# Patient Record
Sex: MALE | Race: BLACK OR AFRICAN AMERICAN | NOT HISPANIC OR LATINO
[De-identification: names, ages, dates, MRNs, and addresses within clinical notes are randomized per-mention and may not be internally consistent; named-entity substitution may affect disease eponyms.]

---

## 2017-02-21 ENCOUNTER — RECORDS - HEALTHEAST (OUTPATIENT)
Dept: ADMINISTRATIVE | Facility: OTHER | Age: 57
End: 2017-02-21

## 2017-02-22 ENCOUNTER — RECORDS - HEALTHEAST (OUTPATIENT)
Dept: ADMINISTRATIVE | Facility: OTHER | Age: 57
End: 2017-02-22

## 2017-12-19 ENCOUNTER — COMMUNICATION - HEALTHEAST (OUTPATIENT)
Dept: INTERNAL MEDICINE | Facility: CLINIC | Age: 57
End: 2017-12-19

## 2018-01-04 ENCOUNTER — COMMUNICATION - HEALTHEAST (OUTPATIENT)
Dept: INTERNAL MEDICINE | Facility: CLINIC | Age: 58
End: 2018-01-04

## 2018-01-05 ENCOUNTER — AMBULATORY - HEALTHEAST (OUTPATIENT)
Dept: INTERNAL MEDICINE | Facility: CLINIC | Age: 58
End: 2018-01-05

## 2018-01-08 ENCOUNTER — OFFICE VISIT - HEALTHEAST (OUTPATIENT)
Dept: INTERNAL MEDICINE | Facility: CLINIC | Age: 58
End: 2018-01-08

## 2018-01-08 DIAGNOSIS — Z00.00 ROUTINE GENERAL MEDICAL EXAMINATION AT A HEALTH CARE FACILITY: ICD-10-CM

## 2018-01-08 DIAGNOSIS — R19.7 DIARRHEA: ICD-10-CM

## 2018-01-08 DIAGNOSIS — G89.29 CHRONIC PAIN OF RIGHT KNEE: ICD-10-CM

## 2018-01-08 DIAGNOSIS — M25.561 CHRONIC PAIN OF RIGHT KNEE: ICD-10-CM

## 2018-01-08 DIAGNOSIS — K62.5 RECTAL BLEED: ICD-10-CM

## 2018-01-08 LAB
ALBUMIN SERPL-MCNC: 3.7 G/DL (ref 3.5–5)
ALBUMIN UR-MCNC: NEGATIVE MG/DL
ALP SERPL-CCNC: 60 U/L (ref 45–120)
ALT SERPL W P-5'-P-CCNC: 13 U/L (ref 0–45)
ANION GAP SERPL CALCULATED.3IONS-SCNC: 10 MMOL/L (ref 5–18)
APPEARANCE UR: CLEAR
AST SERPL W P-5'-P-CCNC: 18 U/L (ref 0–40)
BILIRUB SERPL-MCNC: 0.6 MG/DL (ref 0–1)
BILIRUB UR QL STRIP: NEGATIVE
BUN SERPL-MCNC: 16 MG/DL (ref 8–22)
CALCIUM SERPL-MCNC: 9.5 MG/DL (ref 8.5–10.5)
CHLORIDE BLD-SCNC: 105 MMOL/L (ref 98–107)
CHOLEST SERPL-MCNC: 142 MG/DL
CO2 SERPL-SCNC: 25 MMOL/L (ref 22–31)
COLOR UR AUTO: YELLOW
CREAT SERPL-MCNC: 1.28 MG/DL (ref 0.7–1.3)
ERYTHROCYTE [DISTWIDTH] IN BLOOD BY AUTOMATED COUNT: 13.6 % (ref 11–14.5)
FASTING STATUS PATIENT QL REPORTED: YES
GFR SERPL CREATININE-BSD FRML MDRD: 58 ML/MIN/1.73M2
GLUCOSE BLD-MCNC: 93 MG/DL (ref 70–125)
GLUCOSE UR STRIP-MCNC: NEGATIVE MG/DL
HCT VFR BLD AUTO: 43.1 % (ref 40–54)
HDLC SERPL-MCNC: 64 MG/DL
HGB BLD-MCNC: 14.1 G/DL (ref 14–18)
HGB UR QL STRIP: NEGATIVE
KETONES UR STRIP-MCNC: NEGATIVE MG/DL
LDLC SERPL CALC-MCNC: 68 MG/DL
LEUKOCYTE ESTERASE UR QL STRIP: NEGATIVE
MCH RBC QN AUTO: 29.2 PG (ref 27–34)
MCHC RBC AUTO-ENTMCNC: 32.7 G/DL (ref 32–36)
MCV RBC AUTO: 89 FL (ref 80–100)
NITRATE UR QL: NEGATIVE
PH UR STRIP: 8.5 [PH] (ref 5–8)
PLATELET # BLD AUTO: 310 THOU/UL (ref 140–440)
PMV BLD AUTO: 7.8 FL (ref 7–10)
POTASSIUM BLD-SCNC: 4.2 MMOL/L (ref 3.5–5)
PROT SERPL-MCNC: 7.9 G/DL (ref 6–8)
PSA SERPL-MCNC: 1.8 NG/ML (ref 0–3.5)
RBC # BLD AUTO: 4.83 MILL/UL (ref 4.4–6.2)
SODIUM SERPL-SCNC: 140 MMOL/L (ref 136–145)
SP GR UR STRIP: 1.02 (ref 1–1.03)
TRIGL SERPL-MCNC: 52 MG/DL
UROBILINOGEN UR STRIP-ACNC: ABNORMAL
WBC: 7.1 THOU/UL (ref 4–11)

## 2018-01-08 ASSESSMENT — MIFFLIN-ST. JEOR: SCORE: 1659.27

## 2018-01-09 LAB
25(OH)D3 SERPL-MCNC: 23.8 NG/ML (ref 30–80)
25(OH)D3 SERPL-MCNC: 23.8 NG/ML (ref 30–80)

## 2018-01-10 ENCOUNTER — COMMUNICATION - HEALTHEAST (OUTPATIENT)
Dept: INTERNAL MEDICINE | Facility: CLINIC | Age: 58
End: 2018-01-10

## 2018-01-23 ENCOUNTER — COMMUNICATION - HEALTHEAST (OUTPATIENT)
Dept: INTERNAL MEDICINE | Facility: CLINIC | Age: 58
End: 2018-01-23

## 2018-02-02 ENCOUNTER — RECORDS - HEALTHEAST (OUTPATIENT)
Dept: ADMINISTRATIVE | Facility: OTHER | Age: 58
End: 2018-02-02

## 2018-02-05 ENCOUNTER — RECORDS - HEALTHEAST (OUTPATIENT)
Dept: ADMINISTRATIVE | Facility: OTHER | Age: 58
End: 2018-02-05

## 2019-02-07 ENCOUNTER — OFFICE VISIT - HEALTHEAST (OUTPATIENT)
Dept: INTERNAL MEDICINE | Facility: CLINIC | Age: 59
End: 2019-02-07

## 2019-02-07 DIAGNOSIS — K51.211 ULCERATIVE PROCTITIS WITH RECTAL BLEEDING (H): ICD-10-CM

## 2019-02-07 DIAGNOSIS — M25.562 CHRONIC PAIN OF LEFT KNEE: ICD-10-CM

## 2019-02-07 DIAGNOSIS — Z23 NEED FOR PROPHYLACTIC VACCINATION WITH TETANUS-DIPHTHERIA (TD): ICD-10-CM

## 2019-02-07 DIAGNOSIS — G89.29 CHRONIC PAIN OF LEFT KNEE: ICD-10-CM

## 2019-02-07 DIAGNOSIS — Z23 IMMUNIZATION DUE: ICD-10-CM

## 2019-02-07 DIAGNOSIS — M54.50 LOW BACK PAIN WITHOUT SCIATICA, UNSPECIFIED BACK PAIN LATERALITY, UNSPECIFIED CHRONICITY: ICD-10-CM

## 2019-02-07 DIAGNOSIS — Z00.00 ROUTINE GENERAL MEDICAL EXAMINATION AT A HEALTH CARE FACILITY: ICD-10-CM

## 2019-02-07 LAB
ALBUMIN SERPL-MCNC: 4.2 G/DL (ref 3.5–5)
ALBUMIN UR-MCNC: NEGATIVE MG/DL
ALP SERPL-CCNC: 53 U/L (ref 45–120)
ALT SERPL W P-5'-P-CCNC: 18 U/L (ref 0–45)
ANION GAP SERPL CALCULATED.3IONS-SCNC: 10 MMOL/L (ref 5–18)
APPEARANCE UR: CLEAR
AST SERPL W P-5'-P-CCNC: 21 U/L (ref 0–40)
BASOPHILS # BLD AUTO: 0.1 THOU/UL (ref 0–0.2)
BASOPHILS NFR BLD AUTO: 1 % (ref 0–2)
BILIRUB SERPL-MCNC: 1.3 MG/DL (ref 0–1)
BILIRUB UR QL STRIP: NEGATIVE
BUN SERPL-MCNC: 18 MG/DL (ref 8–22)
CALCIUM SERPL-MCNC: 10.2 MG/DL (ref 8.5–10.5)
CHLORIDE BLD-SCNC: 101 MMOL/L (ref 98–107)
CHOLEST SERPL-MCNC: 170 MG/DL
CO2 SERPL-SCNC: 27 MMOL/L (ref 22–31)
COLOR UR AUTO: YELLOW
CREAT SERPL-MCNC: 1.3 MG/DL (ref 0.7–1.3)
EOSINOPHIL # BLD AUTO: 0.2 THOU/UL (ref 0–0.4)
EOSINOPHIL NFR BLD AUTO: 3 % (ref 0–6)
ERYTHROCYTE [DISTWIDTH] IN BLOOD BY AUTOMATED COUNT: 13.7 % (ref 11–14.5)
FASTING STATUS PATIENT QL REPORTED: YES
GFR SERPL CREATININE-BSD FRML MDRD: 57 ML/MIN/1.73M2
GLUCOSE BLD-MCNC: 82 MG/DL (ref 70–125)
GLUCOSE UR STRIP-MCNC: NEGATIVE MG/DL
HCT VFR BLD AUTO: 43.4 % (ref 40–54)
HDLC SERPL-MCNC: 79 MG/DL
HGB BLD-MCNC: 14.2 G/DL (ref 14–18)
HGB UR QL STRIP: NEGATIVE
KETONES UR STRIP-MCNC: ABNORMAL MG/DL
LDLC SERPL CALC-MCNC: 79 MG/DL
LEUKOCYTE ESTERASE UR QL STRIP: NEGATIVE
LYMPHOCYTES # BLD AUTO: 1.8 THOU/UL (ref 0.8–4.4)
LYMPHOCYTES NFR BLD AUTO: 28 % (ref 20–40)
MCH RBC QN AUTO: 30 PG (ref 27–34)
MCHC RBC AUTO-ENTMCNC: 32.7 G/DL (ref 32–36)
MCV RBC AUTO: 92 FL (ref 80–100)
MONOCYTES # BLD AUTO: 0.8 THOU/UL (ref 0–0.9)
MONOCYTES NFR BLD AUTO: 12 % (ref 2–10)
NEUTROPHILS # BLD AUTO: 3.7 THOU/UL (ref 2–7.7)
NEUTROPHILS NFR BLD AUTO: 57 % (ref 50–70)
NITRATE UR QL: NEGATIVE
PH UR STRIP: 5.5 [PH] (ref 5–8)
PLATELET # BLD AUTO: 274 THOU/UL (ref 140–440)
PMV BLD AUTO: 10.6 FL (ref 8.5–12.5)
POTASSIUM BLD-SCNC: 4.5 MMOL/L (ref 3.5–5)
PROT SERPL-MCNC: 7.7 G/DL (ref 6–8)
PSA SERPL-MCNC: 1.2 NG/ML (ref 0–3.5)
RBC # BLD AUTO: 4.73 MILL/UL (ref 4.4–6.2)
SODIUM SERPL-SCNC: 138 MMOL/L (ref 136–145)
SP GR UR STRIP: 1.01 (ref 1–1.03)
TRIGL SERPL-MCNC: 60 MG/DL
UROBILINOGEN UR STRIP-ACNC: ABNORMAL
WBC: 6.6 THOU/UL (ref 4–11)

## 2019-02-07 ASSESSMENT — MIFFLIN-ST. JEOR: SCORE: 1677.41

## 2019-02-12 ENCOUNTER — COMMUNICATION - HEALTHEAST (OUTPATIENT)
Dept: INTERNAL MEDICINE | Facility: CLINIC | Age: 59
End: 2019-02-12

## 2019-03-08 ENCOUNTER — RECORDS - HEALTHEAST (OUTPATIENT)
Dept: ADMINISTRATIVE | Facility: OTHER | Age: 59
End: 2019-03-08

## 2019-05-03 ENCOUNTER — RECORDS - HEALTHEAST (OUTPATIENT)
Dept: ADMINISTRATIVE | Facility: OTHER | Age: 59
End: 2019-05-03

## 2019-09-24 ENCOUNTER — RECORDS - HEALTHEAST (OUTPATIENT)
Dept: ADMINISTRATIVE | Facility: OTHER | Age: 59
End: 2019-09-24

## 2019-09-26 ENCOUNTER — COMMUNICATION - HEALTHEAST (OUTPATIENT)
Dept: INTERNAL MEDICINE | Facility: CLINIC | Age: 59
End: 2019-09-26

## 2020-06-23 ENCOUNTER — RECORDS - HEALTHEAST (OUTPATIENT)
Dept: ADMINISTRATIVE | Facility: OTHER | Age: 60
End: 2020-06-23

## 2020-06-23 LAB
ALT SERPL W/O P-5'-P-CCNC: 37 U/L (ref 0–78)
AST SERPL-CCNC: 27 U/L (ref 0–37)
CREAT SERPL-MCNC: 1.39 MG/DL (ref 0.7–1.3)

## 2020-06-26 ENCOUNTER — RECORDS - HEALTHEAST (OUTPATIENT)
Dept: ADMINISTRATIVE | Facility: OTHER | Age: 60
End: 2020-06-26

## 2020-06-29 ENCOUNTER — RECORDS - HEALTHEAST (OUTPATIENT)
Dept: HEALTH INFORMATION MANAGEMENT | Facility: CLINIC | Age: 60
End: 2020-06-29

## 2020-09-17 ENCOUNTER — RECORDS - HEALTHEAST (OUTPATIENT)
Dept: ADMINISTRATIVE | Facility: OTHER | Age: 60
End: 2020-09-17

## 2021-05-30 VITALS — WEIGHT: 163 LBS | HEIGHT: 74 IN | BODY MASS INDEX: 20.93 KG/M2 | BODY MASS INDEX: 21.8 KG/M2

## 2021-05-31 VITALS — HEIGHT: 73 IN | BODY MASS INDEX: 23.33 KG/M2 | WEIGHT: 176 LBS

## 2021-06-02 VITALS — BODY MASS INDEX: 23.86 KG/M2 | WEIGHT: 180 LBS | HEIGHT: 73 IN

## 2021-06-15 NOTE — PROGRESS NOTES
Office Visit - Physical   Antoinette Tinoco   57 y.o.  male    Date of visit: 1/8/2018  Physician: Khoa Sinha MD     Assessment and Plan   1. Routine general medical examination at a health care facility  Patient is up-to-date on his health maintenance.  He refuses flu shot.  Labs as below.  He lives a healthy lifestyle.  I have urged him to try to be as active as he can be.  - Comprehensive Metabolic Panel  - Lipid Cascade  - HM2(CBC w/o Differential)  - PSA, Annual Screen (Prostatic-Specific Antigen)  - Urinalysis-UC if Indicated  - Vitamin D, Total (25-Hydroxy)    2. Diarrhea  Given the bright red blood will have him undergo a flex sig.  The diarrhea has resolved.  - Comprehensive Metabolic Panel  - Ambulatory referral for Flex Sig    3. Rectal bleed  As above, likely hemorrhoidal.  He does have known history of hemorrhoids.  We will have him go undergo flex sig and then consider banding if that would be an option.  - HM2(CBC w/o Differential)  - Ambulatory referral for Flex Sig    4. Chronic pain of right knee  Continue to follow with Orth O.  I offered a second opinion but he declines.  Urged staying as active as he can be.        Return in about 1 year (around 1/8/2019) for Annual physical.     Chief Complaint   Chief Complaint   Patient presents with     Annual Exam     fasting today for labs        Patient Profile   Social History     Social History Narrative        Past Medical History   Patient Active Problem List   Diagnosis     Delays In Starting Urination (Hesitancy)     Abdominal Pain In The Central Upper Belly (Epigastric)     Joint Pain, Localized In The Shoulder       Past Surgical History  He has no past surgical history on file.     History of Present Illness   This 57 y.o. old patient comes in today for follow-up.  Reports his been having chronic knee pain.  Sounds like patellofemoral syndrome and chondromalacia.  He is gone through therapies he still unable to do the things he likes to  "like play basketball which has been down.  He remains active though.  Continues work at [employer].  His biggest concern is some episodes of diarrhea that he had last month and then he has had persistent bright red blood per rectum since then.  The diarrhea is better but he continues to have intermittent bleeding.  No large amounts of blood.  No lightheadedness dizziness or symptoms of anemia.  His last colonoscopy was in 2013.    Review of Systems: A comprehensive review of systems was negative except as noted.     Medications and Allergies   Current Outpatient Prescriptions   Medication Sig Dispense Refill     LACTOBACILLUS ACIDOPHILUS (PROBIOTIC ORAL) (capsule) QD       multivitamin (ONE A DAY) per tablet Take 1 tablet by mouth daily.       vitamin A-vitamin C-vit E-min (ANTIOXIDANT) cap Take 1 capsule by mouth daily.       No current facility-administered medications for this visit.      Allergies   Allergen Reactions     Penicillins         Family and Social History   No family history on file.     Social History   Substance Use Topics     Smoking status: Never Smoker     Smokeless tobacco: None     Alcohol use None        Physical Exam   General Appearance:   Pleasant thin gentleman in no distress.    /78 (Patient Site: Right Arm, Patient Position: Sitting, Cuff Size: Adult Regular)  Pulse 77  Ht 6' 0.5\" (1.842 m)  Wt 176 lb (79.8 kg)  SpO2 100%  BMI 23.54 kg/m2    EYES: Eyelids, conjunctiva, and sclera were normal. Pupils were normal. Cornea, iris, and lens were normal bilaterally.  HEAD, EARS, NOSE, MOUTH, AND THROAT: Head and face were normal. Hearing was normal to voice and the ears were normal to external exam. Nose appearance was normal and there was no discharge. Oropharynx was normal.  NECK: Neck appearance was normal. There were no neck masses and the thyroid was not enlarged.  RESPIRATORY: Breathing pattern was normal and the chest moved symmetrically.  Percussion/auscultatory " percussion was normal.  Lung sounds were normal and there were no abnormal sounds.  CARDIOVASCULAR: Heart rate and rhythm were normal.  S1 and S2 were normal and there were no extra sounds or murmurs. Peripheral pulses in arms and legs were normal.  Jugular venous pressure was normal.  There was no peripheral edema.  GASTROINTESTINAL: The abdomen was normal in contour.  Bowel sounds were present.  Percussion detected no organ enlargement or tenderness.  Palpation detected no tenderness, mass, or enlarged organs.   MUSCULOSKELETAL: Skeletal configuration was normal and muscle mass was normal for age. Joint appearance was overall normal.  LYMPHATIC: There were no enlarged nodes.  SKIN/HAIR/NAILS: Skin color was normal.  There were no skin lesions.  Hair and nails were normal.  NEUROLOGIC: The patient was alert and oriented to person, place, time, and circumstance. Speech was normal. Cranial nerves were normal. Motor strength was normal for age. The patient was normally coordinated.  PSYCHIATRIC:  Mood and affect were normal and the patient had normal recent and remote memory. The patient's judgment and insight were normal.    ADDITIONAL VITAL SIGNS: None  CHEST WALL/BREASTS: Normal  RECTAL: Normal rectal tone.  Prostate normal in size.  No nodules.  No blood on the finger.  GENITAL/URINARY: Normal     Additional Information        Khoa Sinha MD  Internal Medicine  Contact me at [phone]

## 2021-06-16 PROBLEM — G89.29 CHRONIC PAIN OF LEFT KNEE: Status: ACTIVE | Noted: 2019-02-07

## 2021-06-16 PROBLEM — M25.562 CHRONIC PAIN OF LEFT KNEE: Status: ACTIVE | Noted: 2019-02-07

## 2021-06-16 PROBLEM — K51.211 ULCERATIVE PROCTITIS WITH RECTAL BLEEDING (H): Status: ACTIVE | Noted: 2019-02-07

## 2021-06-18 NOTE — LETTER
Letter by Khoa Sinha MD at      Author: Khoa Sinha MD Service: -- Author Type: --    Filed:  Encounter Date: 2/12/2019 Status: (Other)       Antoinette Tinoco  [address]  [place] [address]             February 12, 2019         Dear Mr. Tinoco,    Below are the results from your recent visit:    Resulted Orders   Lipid Cascade   Result Value Ref Range    Cholesterol 170 <=199 mg/dL    Triglycerides 60 <=149 mg/dL    HDL Cholesterol 79 >=40 mg/dL    LDL Calculated 79 <=129 mg/dL    Patient Fasting > 8hrs? Yes    Comprehensive Metabolic Panel   Result Value Ref Range    Sodium 138 136 - 145 mmol/L    Potassium 4.5 3.5 - 5.0 mmol/L    Chloride 101 98 - 107 mmol/L    CO2 27 22 - 31 mmol/L    Anion Gap, Calculation 10 5 - 18 mmol/L    Glucose 82 70 - 125 mg/dL    BUN 18 8 - 22 mg/dL    Creatinine 1.30 0.70 - 1.30 mg/dL    GFR MDRD Af Amer >60 >60 mL/min/1.73m2    GFR MDRD Non Af Amer 57 (L) >60 mL/min/1.73m2    Bilirubin, Total 1.3 (H) 0.0 - 1.0 mg/dL    Calcium 10.2 8.5 - 10.5 mg/dL    Protein, Total 7.7 6.0 - 8.0 g/dL    Albumin 4.2 3.5 - 5.0 g/dL    Alkaline Phosphatase 53 45 - 120 U/L    AST 21 0 - 40 U/L    ALT 18 0 - 45 U/L    Narrative    Fasting Glucose reference range is 70-99 mg/dL per  American Diabetes Association (ADA) guidelines.   Urinalysis-UC if Indicated   Result Value Ref Range    Color, UA Yellow Colorless, Yellow, Straw, Light Yellow    Clarity, UA Clear Clear    Glucose, UA Negative Negative    Bilirubin, UA Negative Negative    Ketones, UA Trace (!) Negative    Specific Gravity, UA 1.010 1.005 - 1.030    Blood, UA Negative Negative    pH, UA 5.5 5.0 - 8.0    Protein, UA Negative Negative mg/dL    Urobilinogen, UA 0.2 E.U./dL 0.2 E.U./dL, 1.0 E.U./dL    Nitrite, UA Negative Negative    Leukocytes, UA Negative Negative    Narrative    Microscopic not indicated  UC not indicated   PSA, Annual Screen (Prostatic-Specific Antigen)   Result Value Ref Range    PSA 1.2 0.0 - 3.5 ng/mL     Narrative    Method is Abbott Prostate-Specific Antigen (PSA)  Standard-WHO 1st International (90:10)   HM1 (CBC with Diff)   Result Value Ref Range    WBC 6.6 4.0 - 11.0 thou/uL    RBC 4.73 4.40 - 6.20 mill/uL    Hemoglobin 14.2 14.0 - 18.0 g/dL    Hematocrit 43.4 40.0 - 54.0 %    MCV 92 80 - 100 fL    MCH 30.0 27.0 - 34.0 pg    MCHC 32.7 32.0 - 36.0 g/dL    RDW 13.7 11.0 - 14.5 %    Platelets 274 140 - 440 thou/uL    MPV 10.6 8.5 - 12.5 fL    Neutrophils % 57 50 - 70 %    Lymphocytes % 28 20 - 40 %    Monocytes % 12 (H) 2 - 10 %    Eosinophils % 3 0 - 6 %    Basophils % 1 0 - 2 %    Neutrophils Absolute 3.7 2.0 - 7.7 thou/uL    Lymphocytes Absolute 1.8 0.8 - 4.4 thou/uL    Monocytes Absolute 0.8 0.0 - 0.9 thou/uL    Eosinophils Absolute 0.2 0.0 - 0.4 thou/uL    Basophils Absolute 0.1 0.0 - 0.2 thou/uL       Your labs all look very good.     Please call with questions or contact us using MyChart.    Sincerely,        Electronically signed by Khoa Sinha MD

## 2021-06-19 NOTE — LETTER
Letter by Khoa Sinha MD at      Author: Khoa Sinha MD Service: -- Author Type: --    Filed:  Encounter Date: 9/26/2019 Status: Signed         09/26/19          Antoinette Tinoco  [address]  [place] [address]    Dear Antoinette,    As a valued [facility] patient, your healthcare needs are our priority. We are contacting you in regards to your upcoming appointment on February 13th, 2020 at 1:00 pm for your physical Dr. Sinha will be out of office. Our clinic records indicate we have attempted to contact you to re-schedule the appointment, but have not heard back from you after three (3) attempts. To prevent further delays in your care please contact our office to re-schedule your appointment as soon as possible.      Sincerely,  Ron Vargas

## 2021-06-23 NOTE — PROGRESS NOTES
Office Visit - Physical   Antoinette Tinoco   58 y.o.  male    Date of visit: 2/7/2019  Physician: Khoa Sinha MD     Assessment and Plan   1. Routine general medical examination at a health care facility  Patient was a healthy lifestyle.  I urged influenza vaccination and he refuses.  I will continue to see him yearly.  - HM1(CBC and Differential)  - Lipid Cascade  - Comprehensive Metabolic Panel  - Urinalysis-UC if Indicated  - PSA, Annual Screen (Prostatic-Specific Antigen)  - HM1 (CBC with Diff)    2. Ulcerative proctitis with rectal bleeding (H)  Urged follow-up with gastroenterology.  We discussed the increased risk of colon cancer in people with inflammatory bowel diseases.  Labs as below.  - Ambulatory referral to Gastroenterology  - HM1(CBC and Differential)  - Comprehensive Metabolic Panel  - HM1 (CBC with Diff)    3. Chronic pain of left knee  Follow-up with orthopedics as needed.    4. Low back pain without sciatica, unspecified back pain laterality, unspecified chronicity  I suggested physical therapy and he declines this.  We discussed exercising for preventative measures and he states that he does not like to do that type of stuff.  He would be willing to meet with a chiropractor at least.  We will put in referral to chiropractic care.  - Ambulatory referral to Chiropractic        Return in about 1 year (around 2/7/2020) for Annual physical.     Chief Complaint   Chief Complaint   Patient presents with     Annual Exam     fasting        Patient Profile   Social History     Social History Narrative     Not on file        Past Medical History   Patient Active Problem List   Diagnosis     Delays In Starting Urination (Hesitancy)     Abdominal Pain In The Central Upper Belly (Epigastric)     Joint Pain, Localized In The Shoulder     Ulcerative proctitis with rectal bleeding (H)     Chronic pain of left knee       Past Surgical History  He has no past surgical history on file.     History of Present  "Illness   This 58 y.o. old cannot comes in today for physical.  I have not seen him in a year.  He reports his been doing very well.  He does get continued left knee pain had to give up basketball for that.  He was diagnosed I believe with chondromalacia.  Still gives him troubles at times but does fairly well.  He is active.  He is trying to complete 50,000 push-ups in a year.  He has been having intermittent back spasms.  He does not know what triggers them.  Can be quite painful.  Otherwise has been doing well.  He was diagnosed with ulcerative colitis last year but failed to follow-up with gastroenterology as suggested.  He was on suppositories which worked well but now is off of them and is having recurrence of his bleeding.  No large amount of blood.    No family history of inflammatory bowel diseases.    Mother was moved here a few years ago and lives in senior housing near [facility].  She is doing well.  No other new family history.  Father is doing well and is in [place].  A grandmother just  in her upper 90s.  Daughter is expecting her sixth child.  She is expecting twins.  This has him worried.    Review of Systems: A comprehensive review of systems was negative except as noted.     Medications and Allergies   No current outpatient medications on file.     No current facility-administered medications for this visit.      Allergies   Allergen Reactions     Penicillins         Family and Social History   No family history on file.     Social History     Tobacco Use     Smoking status: Never Smoker     Smokeless tobacco: Never Used   Substance Use Topics     Alcohol use: Not on file     Drug use: Not on file        Physical Exam   General Appearance:   Very pleasant fit -American gentleman in no distress    /80 (Patient Site: Right Arm, Patient Position: Sitting, Cuff Size: Adult Regular)   Pulse 72   Ht 6' 0.5\" (1.842 m)   Wt 180 lb (81.6 kg)   SpO2 100%   BMI 24.08 kg/m  "     EYES: Eyelids, conjunctiva, and sclera were normal. Pupils were normal. Cornea, iris, and lens were normal bilaterally.  HEAD, EARS, NOSE, MOUTH, AND THROAT: Head and face were normal. Hearing was normal to voice and the ears were normal to external exam. Nose appearance was normal and there was no discharge. Oropharynx was normal.  NECK: Neck appearance was normal. There were no neck masses and the thyroid was not enlarged.  RESPIRATORY: Breathing pattern was normal and the chest moved symmetrically.  Percussion/auscultatory percussion was normal.  Lung sounds were normal and there were no abnormal sounds.  CARDIOVASCULAR: Heart rate and rhythm were normal.  S1 and S2 were normal and there were no extra sounds or murmurs. Peripheral pulses in arms and legs were normal.  Jugular venous pressure was normal.  There was no peripheral edema.  GASTROINTESTINAL: The abdomen was normal in contour.  Bowel sounds were present.  Percussion detected no organ enlargement or tenderness.  Palpation detected no tenderness, mass, or enlarged organs.   MUSCULOSKELETAL: Skeletal configuration was normal and muscle mass was normal for age. Joint appearance was overall normal.  LYMPHATIC: There were no enlarged nodes.  SKIN/HAIR/NAILS: Skin color was normal.  There were no skin lesions.  Hair and nails were normal.  NEUROLOGIC: The patient was alert and oriented to person, place, time, and circumstance. Speech was normal. Cranial nerves were normal. Motor strength was normal for age. The patient was normally coordinated.  PSYCHIATRIC:  Mood and affect were normal and the patient had normal recent and remote memory. The patient's judgment and insight were normal.    ADDITIONAL VITAL SIGNS: none  CHEST WALL/BREASTS: Normal  RECTAL: Normal rectal tone.  No pain.  No blood noted.  Prostate exam normal.  GENITAL/URINARY: Normal penis and testicles.     Additional Information        Khoa Sinha MD  Internal Medicine  Contact me at  [phone]

## 2021-06-27 ENCOUNTER — HEALTH MAINTENANCE LETTER (OUTPATIENT)
Age: 61
End: 2021-06-27

## 2021-07-03 NOTE — ADDENDUM NOTE
Addendum Note by Gloria Marx MA at 2/7/2019  1:00 PM     Author: Gloria Marx MA Service: -- Author Type: Medical Assistant    Filed: 2/7/2019  4:25 PM Encounter Date: 2/7/2019 Status: Signed    : Gloria Marx MA (Medical Assistant)    Addended by: GLORIA MARX on: 2/7/2019 04:25 PM        Modules accepted: Orders

## 2021-10-17 ENCOUNTER — HEALTH MAINTENANCE LETTER (OUTPATIENT)
Age: 61
End: 2021-10-17

## 2021-11-19 ENCOUNTER — TRANSFERRED RECORDS (OUTPATIENT)
Dept: HEALTH INFORMATION MANAGEMENT | Facility: CLINIC | Age: 61
End: 2021-11-19
Payer: COMMERCIAL

## 2021-11-19 LAB
ALT SERPL-CCNC: 20 IU/L (ref 0–44)
AST SERPL-CCNC: 18 IU/L (ref 0–40)
CREATININE (EXTERNAL): 1.25 MG/DL (ref 0.76–1.27)
GFR ESTIMATED (EXTERNAL): 62 ML/MIN/1.73M2
GFR ESTIMATED (IF AFRICAN AMERICAN) (EXTERNAL): 72 ML/MIN/1.73M2

## 2021-12-13 ENCOUNTER — TRANSFERRED RECORDS (OUTPATIENT)
Dept: HEALTH INFORMATION MANAGEMENT | Facility: CLINIC | Age: 61
End: 2021-12-13

## 2021-12-13 ENCOUNTER — IMMUNIZATION (OUTPATIENT)
Dept: NURSING | Facility: CLINIC | Age: 61
End: 2021-12-13
Payer: COMMERCIAL

## 2021-12-13 PROCEDURE — 91300 PR COVID VAC PFIZER DIL RECON 30 MCG/0.3 ML IM: CPT

## 2021-12-13 PROCEDURE — 0004A PR COVID VAC PFIZER DIL RECON 30 MCG/0.3 ML IM: CPT

## 2022-01-12 ENCOUNTER — OFFICE VISIT (OUTPATIENT)
Dept: INTERNAL MEDICINE | Facility: CLINIC | Age: 62
End: 2022-01-12
Payer: COMMERCIAL

## 2022-01-12 VITALS
DIASTOLIC BLOOD PRESSURE: 60 MMHG | HEIGHT: 73 IN | TEMPERATURE: 97.8 F | WEIGHT: 179 LBS | BODY MASS INDEX: 23.72 KG/M2 | HEART RATE: 58 BPM | SYSTOLIC BLOOD PRESSURE: 122 MMHG | OXYGEN SATURATION: 95 %

## 2022-01-12 DIAGNOSIS — R68.82 LOW LIBIDO: ICD-10-CM

## 2022-01-12 DIAGNOSIS — Z00.00 ROUTINE ADULT HEALTH MAINTENANCE: Primary | ICD-10-CM

## 2022-01-12 DIAGNOSIS — K51.211 ULCERATIVE PROCTITIS WITH RECTAL BLEEDING (H): ICD-10-CM

## 2022-01-12 DIAGNOSIS — Z71.84 TRAVEL ADVICE ENCOUNTER: ICD-10-CM

## 2022-01-12 DIAGNOSIS — K64.9 HEMORRHOIDS, UNSPECIFIED HEMORRHOID TYPE: ICD-10-CM

## 2022-01-12 DIAGNOSIS — M25.512 PAIN IN JOINT OF LEFT SHOULDER: ICD-10-CM

## 2022-01-12 LAB
ALBUMIN UR-MCNC: NEGATIVE MG/DL
APPEARANCE UR: CLEAR
BILIRUB UR QL STRIP: NEGATIVE
COLOR UR AUTO: YELLOW
GLUCOSE UR STRIP-MCNC: NEGATIVE MG/DL
HGB UR QL STRIP: NEGATIVE
KETONES UR STRIP-MCNC: NEGATIVE MG/DL
LEUKOCYTE ESTERASE UR QL STRIP: NEGATIVE
NITRATE UR QL: NEGATIVE
PH UR STRIP: 5 [PH] (ref 5–8)
SP GR UR STRIP: >=1.03 (ref 1–1.03)
UROBILINOGEN UR STRIP-ACNC: 0.2 E.U./DL

## 2022-01-12 PROCEDURE — 99396 PREV VISIT EST AGE 40-64: CPT | Performed by: INTERNAL MEDICINE

## 2022-01-12 PROCEDURE — 87389 HIV-1 AG W/HIV-1&-2 AB AG IA: CPT | Performed by: INTERNAL MEDICINE

## 2022-01-12 PROCEDURE — 80061 LIPID PANEL: CPT | Performed by: INTERNAL MEDICINE

## 2022-01-12 PROCEDURE — 81003 URINALYSIS AUTO W/O SCOPE: CPT | Performed by: INTERNAL MEDICINE

## 2022-01-12 PROCEDURE — 86803 HEPATITIS C AB TEST: CPT | Performed by: INTERNAL MEDICINE

## 2022-01-12 PROCEDURE — G0103 PSA SCREENING: HCPCS | Performed by: INTERNAL MEDICINE

## 2022-01-12 PROCEDURE — 36415 COLL VENOUS BLD VENIPUNCTURE: CPT | Performed by: INTERNAL MEDICINE

## 2022-01-12 RX ORDER — FOLIC ACID 1 MG/1
1 TABLET ORAL DAILY
COMMUNITY
Start: 2021-11-12 | End: 2024-05-16

## 2022-01-12 RX ORDER — SULFASALAZINE 500 MG/1
4 TABLET ORAL DAILY
COMMUNITY
Start: 2021-11-12 | End: 2024-05-16

## 2022-01-12 ASSESSMENT — MIFFLIN-ST. JEOR: SCORE: 1667.88

## 2022-01-12 NOTE — PROGRESS NOTES
SUBJECTIVE:   CC: Antoinette Tinoco is an 61 year old male who presents for preventative health visit.     Boubacar comes in today for follow-up.  Reports has been doing fairly well.  He needs a COVID test before traveling to [place] later this month.  His ulcerative colitis is in remission.  He does have issues with hemorrhoids that come and go and occasionally bleed.  They have told him that they cannot band them.  They really do not want to do anything with them but surgery would be an option.  His left shoulder has been having twinges of pain.  He thinks he may have to have rotator cuff surgery on that one like he did the right.  Is not terrible right now.  He is working from home enjoys that a lot.  Parents are doing well.  Mother is here in town living in [facility].  Father is still in [place].  He has a sister with a lot of medical issues also in [place].  He continues to work for [employer].  He will continue to work there for another few years.  He helps support his 6 grandchildren here in town.    Patient has been advised of split billing requirements and indicates understanding: Yes  Healthy Habits:     Getting at least 3 servings of Calcium per day:  NO    Bi-annual eye exam:  NO    Dental care twice a year:  Yes    Sleep apnea or symptoms of sleep apnea:  None    Diet:  Regular (no restrictions)    Frequency of exercise:  None    Taking medications regularly:  Yes    Medication side effects:  None    PHQ-2 Total Score: 2    Additional concerns today:  No              Today's PHQ-2 Score:   PHQ-2 ( 1999 Pfizer) 1/11/2022   Q1: Little interest or pleasure in doing things 1   Q2: Feeling down, depressed or hopeless 1   PHQ-2 Score 2   Q1: Little interest or pleasure in doing things Several days   Q2: Feeling down, depressed or hopeless Several days   PHQ-2 Score 2       Abuse: Current or Past(Physical, Sexual or Emotional)- No  Do you feel safe in your environment? No    Have you  ever done Advance Care Planning? (For example, a Health Directive, POLST, or a discussion with a medical provider or your loved ones about your wishes): Yes, patient states has an Advance Care Planning document and will bring a copy to the clinic.    Social History     Tobacco Use     Smoking status: Never Smoker     Smokeless tobacco: Never Used   Substance Use Topics     Alcohol use: Not on file     If you drink alcohol do you typically have >3 drinks per day or >7 drinks per week? No    Alcohol Use 1/11/2022   Prescreen: >3 drinks/day or >7 drinks/week? Yes   AUDIT SCORE  10       Last PSA:   Prostate Specific Antigen Screen   Date Value Ref Range Status   02/07/2019 1.2 0.0 - 3.5 ng/mL Final       Reviewed orders with patient. Reviewed health maintenance and updated orders accordingly - Yes      Reviewed and updated as needed this visit by clinical staff   Allergies  Meds             Reviewed and updated as needed this visit by Provider                   Review of Systems   ROS: 10 point ROS neg other than the symptoms noted above in the HPI.    OBJECTIVE:   There were no vitals taken for this visit.    Physical Exam  GENERAL: healthy, alert and no distress  EYES: Eyes grossly normal to inspection, PERRL and conjunctivae and sclerae normal  HENT: ear canals and TM's normal, nose and mouth without ulcers or lesions  NECK: no adenopathy, no asymmetry, masses, or scars and thyroid normal to palpation  RESP: lungs clear to auscultation - no rales, rhonchi or wheezes  CV: regular rate and rhythm, normal S1 S2, no S3 or S4, no murmur, click or rub, no peripheral edema and peripheral pulses strong  ABDOMEN: soft, nontender, no hepatosplenomegaly, no masses and bowel sounds normal   (male): normal male genitalia without lesions or urethral discharge, no hernia  RECTAL: prostate of normal size for age, smooth, nontender without masses/nodules and int hemorrhoid  MS: no gross musculoskeletal defects noted, no  edema  SKIN: no suspicious lesions or rashes  NEURO: Normal strength and tone, mentation intact and speech normal  PSYCH: mentation appears normal, affect normal/bright        ASSESSMENT/PLAN:   1. Routine adult health maintenance  Patient lives an active and healthy lifestyle.  He gets regular labs through his gastroenterologist.  I do not need to repeat those.  He refuses flu shot today.  We discussed shingles vaccination today.  He has had his pneumococcal vaccine  - HIV Antigen Antibody Combo; Future  - Hepatitis C Screen Reflex to HCV RNA Quant and Genotype; Future  - Lipid panel reflex to direct LDL Fasting; Future  - PSA, screen; Future  - UA Macro with Reflex to Micro and Culture - lab collect; Future  - HIV Antigen Antibody Combo  - Hepatitis C Screen Reflex to HCV RNA Quant and Genotype  - Lipid panel reflex to direct LDL Fasting  - PSA, screen  - UA Macro with Reflex to Micro and Culture - lab collect    2. Ulcerative proctitis with rectal bleeding (H)  Continue with regular colonoscopies and medications per gastroenterology.    3. Hemorrhoids, unspecified hemorrhoid type  Preparation H discussed.  Surgical consultation if really bad per gastroenterology.    4. Travel advice encounter  I have ordered his future COVID testing.  - Asymptomatic COVID-19 Virus (Coronavirus) by PCR Nose; Future    5. Pain in joint of left shoulder  He will let me know if he wants to go back to see his orthopedist.  He sees someone with [facility] the [facility].    6. Low libido  He notes low libido.  He could have hypogonadism.  Does not really have any other symptoms of this.  It is late in the afternoon so I am not going to check a testosterone level.  He is not too concerned about this.  We can readdress next year.  If he changes his mind he will let me know and I will put in a morning testosterone level order    Patient has been advised of split billing requirements and indicates understanding: Yes  COUNSELING:  "  Reviewed preventive health counseling, as reflected in patient instructions    Estimated body mass index is 24.08 kg/m  as calculated from the following:    Height as of 2/7/19: 1.842 m (6' 0.5\").    Weight as of 2/7/19: 81.6 kg (180 lb).         He reports that he has never smoked. He has never used smokeless tobacco.      Counseling Resources:  ATP IV Guidelines  Pooled Cohorts Equation Calculator  FRAX Risk Assessment  ICSI Preventive Guidelines  Dietary Guidelines for Americans, 2010  USDA's MyPlate  ASA Prophylaxis  Lung CA Screening    RADHA ROSS MD  [facility]  "

## 2022-01-12 NOTE — LETTER
January 17, 2022      Antoinette Tinoco  [address]  [place] [address]      Your labs look perfect.           Resulted Orders   HIV Antigen Antibody Combo   Result Value Ref Range    HIV Antigen Antibody Combo Negative Negative   Hepatitis C Screen Reflex to HCV RNA Quant and Genotype   Result Value Ref Range    Hepatitis C Antibody Nonreactive Nonreactive    Narrative    Assay performance characteristics have not been established for newborns, infants, and children.   Lipid panel reflex to direct LDL Fasting   Result Value Ref Range    Cholesterol 179 <=199 mg/dL    Triglycerides 52 <=149 mg/dL    Direct Measure HDL 86 >=40 mg/dL      Comment:      HDL Cholesterol Reference Range:     0-2 years:   No reference ranges established for patients under 2 years old  at [facility] [employer] for lipid analytes.    2-8 years:  Greater than 45 mg/dL     18 years and older:   Female: Greater than or equal to 50 mg/dL   Male:   Greater than or equal to 40 mg/dL    LDL Cholesterol Calculated 83 <=129 mg/dL    Patient Fasting > 8hrs? Yes    PSA, screen   Result Value Ref Range    Prostate Specific Antigen Screen 1.68 0.00 - 4.50 ug/L    Narrative    Assay Method is Abbott Prostate-Specific Antigen (PSA)  Standard-WHO 1st International (90:10)   UA Macro with Reflex to Micro and Culture - lab collect   Result Value Ref Range    Color Urine Yellow Colorless, Straw, Light Yellow, Yellow    Appearance Urine Clear Clear    Glucose Urine Negative Negative mg/dL    Bilirubin Urine Negative Negative    Ketones Urine Negative Negative mg/dL    Specific Gravity Urine >=1.030 1.005 - 1.030    Blood Urine Negative Negative    pH Urine 5.0 5.0 - 8.0    Protein Albumin Urine Negative Negative mg/dL    Urobilinogen Urine 0.2 0.2, 1.0 E.U./dL    Nitrite Urine Negative Negative    Leukocyte Esterase Urine Negative Negative    Narrative    Microscopic not indicated       If you have any questions or concerns, please call the clinic at  the number listed above.       Sincerely,      Khoa Sinha MD

## 2022-01-13 LAB
CHOLEST SERPL-MCNC: 179 MG/DL
FASTING STATUS PATIENT QL REPORTED: YES
HCV AB SERPL QL IA: NONREACTIVE
HDLC SERPL-MCNC: 86 MG/DL
HIV 1+2 AB+HIV1 P24 AG SERPL QL IA: NEGATIVE
LDLC SERPL CALC-MCNC: 83 MG/DL
PSA SERPL-MCNC: 1.68 UG/L (ref 0–4.5)
TRIGL SERPL-MCNC: 52 MG/DL

## 2022-01-25 ENCOUNTER — LAB (OUTPATIENT)
Dept: LAB | Facility: CLINIC | Age: 62
End: 2022-01-25
Attending: INTERNAL MEDICINE
Payer: COMMERCIAL

## 2022-01-25 DIAGNOSIS — Z71.84 TRAVEL ADVICE ENCOUNTER: ICD-10-CM

## 2022-01-25 LAB — SARS-COV-2 RNA RESP QL NAA+PROBE: NEGATIVE

## 2022-01-25 PROCEDURE — U0005 INFEC AGEN DETEC AMPLI PROBE: HCPCS

## 2022-01-25 PROCEDURE — U0003 INFECTIOUS AGENT DETECTION BY NUCLEIC ACID (DNA OR RNA); SEVERE ACUTE RESPIRATORY SYNDROME CORONAVIRUS 2 (SARS-COV-2) (CORONAVIRUS DISEASE [COVID-19]), AMPLIFIED PROBE TECHNIQUE, MAKING USE OF HIGH THROUGHPUT TECHNOLOGIES AS DESCRIBED BY CMS-2020-01-R: HCPCS

## 2022-05-10 ENCOUNTER — TRANSFERRED RECORDS (OUTPATIENT)
Dept: HEALTH INFORMATION MANAGEMENT | Facility: CLINIC | Age: 62
End: 2022-05-10
Payer: COMMERCIAL

## 2022-05-20 ENCOUNTER — TRANSFERRED RECORDS (OUTPATIENT)
Dept: HEALTH INFORMATION MANAGEMENT | Facility: CLINIC | Age: 62
End: 2022-05-20
Payer: COMMERCIAL

## 2022-05-20 LAB
ALT SERPL-CCNC: 17 IU/L (ref 0–44)
AST SERPL-CCNC: 23 IU/L (ref 0–40)
CREATININE (EXTERNAL): 1.34 MG/DL (ref 0.76–1.27)
GFR ESTIMATED (EXTERNAL): 61 ML/MIN/1.73

## 2022-06-03 ENCOUNTER — IMMUNIZATION (OUTPATIENT)
Dept: NURSING | Facility: CLINIC | Age: 62
End: 2022-06-03
Payer: COMMERCIAL

## 2022-06-03 PROCEDURE — 0054A COVID-19,PF,PFIZER (12+ YRS): CPT

## 2022-06-03 PROCEDURE — 91305 COVID-19,PF,PFIZER (12+ YRS): CPT

## 2022-06-17 ENCOUNTER — TRANSFERRED RECORDS (OUTPATIENT)
Dept: HEALTH INFORMATION MANAGEMENT | Facility: CLINIC | Age: 62
End: 2022-06-17

## 2022-10-01 ENCOUNTER — HEALTH MAINTENANCE LETTER (OUTPATIENT)
Age: 62
End: 2022-10-01

## 2022-10-31 ENCOUNTER — IMMUNIZATION (OUTPATIENT)
Dept: FAMILY MEDICINE | Facility: CLINIC | Age: 62
End: 2022-10-31
Payer: COMMERCIAL

## 2022-10-31 DIAGNOSIS — Z23 ENCOUNTER FOR ADMINISTRATION OF COVID-19 VACCINE: Primary | ICD-10-CM

## 2022-10-31 PROCEDURE — 99207 PR NO CHARGE NURSE ONLY: CPT

## 2022-10-31 PROCEDURE — 0124A COVID-19,PF,PFIZER BOOSTER BIVALENT: CPT

## 2022-10-31 PROCEDURE — 91312 COVID-19,PF,PFIZER BOOSTER BIVALENT: CPT

## 2023-05-14 ENCOUNTER — HEALTH MAINTENANCE LETTER (OUTPATIENT)
Age: 63
End: 2023-05-14

## 2023-07-18 ENCOUNTER — TRANSFERRED RECORDS (OUTPATIENT)
Dept: HEALTH INFORMATION MANAGEMENT | Facility: CLINIC | Age: 63
End: 2023-07-18
Payer: COMMERCIAL

## 2023-11-07 ENCOUNTER — ANCILLARY PROCEDURE (OUTPATIENT)
Dept: GENERAL RADIOLOGY | Facility: CLINIC | Age: 63
End: 2023-11-07
Attending: INTERNAL MEDICINE
Payer: COMMERCIAL

## 2023-11-07 ENCOUNTER — OFFICE VISIT (OUTPATIENT)
Dept: INTERNAL MEDICINE | Facility: CLINIC | Age: 63
End: 2023-11-07
Payer: COMMERCIAL

## 2023-11-07 VITALS
TEMPERATURE: 98.7 F | DIASTOLIC BLOOD PRESSURE: 72 MMHG | OXYGEN SATURATION: 98 % | SYSTOLIC BLOOD PRESSURE: 144 MMHG | HEIGHT: 73 IN | HEART RATE: 87 BPM | WEIGHT: 188 LBS | BODY MASS INDEX: 24.92 KG/M2 | RESPIRATION RATE: 16 BRPM

## 2023-11-07 DIAGNOSIS — M54.50 CHRONIC BILATERAL LOW BACK PAIN WITHOUT SCIATICA: ICD-10-CM

## 2023-11-07 DIAGNOSIS — K51.211 ULCERATIVE PROCTITIS WITH RECTAL BLEEDING (H): ICD-10-CM

## 2023-11-07 DIAGNOSIS — H20.9: ICD-10-CM

## 2023-11-07 DIAGNOSIS — R22.31 MASS OF RIGHT HAND: ICD-10-CM

## 2023-11-07 DIAGNOSIS — G89.29 CHRONIC BILATERAL LOW BACK PAIN WITHOUT SCIATICA: ICD-10-CM

## 2023-11-07 DIAGNOSIS — M25.551 HIP PAIN, RIGHT: ICD-10-CM

## 2023-11-07 DIAGNOSIS — K51.90: ICD-10-CM

## 2023-11-07 DIAGNOSIS — Z00.00 ROUTINE ADULT HEALTH MAINTENANCE: Primary | ICD-10-CM

## 2023-11-07 LAB
ALBUMIN SERPL BCG-MCNC: 4.5 G/DL (ref 3.5–5.2)
ALBUMIN UR-MCNC: NEGATIVE MG/DL
ALP SERPL-CCNC: 61 U/L (ref 40–129)
ALT SERPL W P-5'-P-CCNC: 22 U/L (ref 0–70)
ANION GAP SERPL CALCULATED.3IONS-SCNC: 13 MMOL/L (ref 7–15)
APPEARANCE UR: CLEAR
AST SERPL W P-5'-P-CCNC: 26 U/L (ref 0–45)
BASOPHILS # BLD AUTO: 0.1 10E3/UL (ref 0–0.2)
BASOPHILS NFR BLD AUTO: 1 %
BILIRUB SERPL-MCNC: 0.4 MG/DL
BILIRUB UR QL STRIP: NEGATIVE
BUN SERPL-MCNC: 19.4 MG/DL (ref 8–23)
CALCIUM SERPL-MCNC: 9.6 MG/DL (ref 8.8–10.2)
CHLORIDE SERPL-SCNC: 103 MMOL/L (ref 98–107)
CHOLEST SERPL-MCNC: 192 MG/DL
COLOR UR AUTO: YELLOW
CREAT SERPL-MCNC: 1.4 MG/DL (ref 0.67–1.17)
CRP SERPL-MCNC: <3 MG/L
DEPRECATED HCO3 PLAS-SCNC: 24 MMOL/L (ref 22–29)
EGFRCR SERPLBLD CKD-EPI 2021: 57 ML/MIN/1.73M2
EOSINOPHIL # BLD AUTO: 0.3 10E3/UL (ref 0–0.7)
EOSINOPHIL NFR BLD AUTO: 4 %
ERYTHROCYTE [DISTWIDTH] IN BLOOD BY AUTOMATED COUNT: 13.3 % (ref 10–15)
ERYTHROCYTE [SEDIMENTATION RATE] IN BLOOD BY WESTERGREN METHOD: 6 MM/HR (ref 0–20)
FERRITIN SERPL-MCNC: 64 NG/ML (ref 31–409)
GLUCOSE SERPL-MCNC: 94 MG/DL (ref 70–99)
GLUCOSE UR STRIP-MCNC: NEGATIVE MG/DL
HCT VFR BLD AUTO: 44.1 % (ref 40–53)
HDLC SERPL-MCNC: 87 MG/DL
HGB BLD-MCNC: 14.1 G/DL (ref 13.3–17.7)
HGB UR QL STRIP: NEGATIVE
IMM GRANULOCYTES # BLD: 0 10E3/UL
IMM GRANULOCYTES NFR BLD: 0 %
IRON BINDING CAPACITY (ROCHE): 316 UG/DL (ref 240–430)
IRON SATN MFR SERPL: 31 % (ref 15–46)
IRON SERPL-MCNC: 98 UG/DL (ref 61–157)
KETONES UR STRIP-MCNC: NEGATIVE MG/DL
LDLC SERPL CALC-MCNC: 85 MG/DL
LEUKOCYTE ESTERASE UR QL STRIP: NEGATIVE
LYMPHOCYTES # BLD AUTO: 1.8 10E3/UL (ref 0.8–5.3)
LYMPHOCYTES NFR BLD AUTO: 24 %
MCH RBC QN AUTO: 29.8 PG (ref 26.5–33)
MCHC RBC AUTO-ENTMCNC: 32 G/DL (ref 31.5–36.5)
MCV RBC AUTO: 93 FL (ref 78–100)
MONOCYTES # BLD AUTO: 0.9 10E3/UL (ref 0–1.3)
MONOCYTES NFR BLD AUTO: 13 %
NEUTROPHILS # BLD AUTO: 4.2 10E3/UL (ref 1.6–8.3)
NEUTROPHILS NFR BLD AUTO: 59 %
NITRATE UR QL: NEGATIVE
NONHDLC SERPL-MCNC: 105 MG/DL
PH UR STRIP: 5.5 [PH] (ref 5–8)
PLATELET # BLD AUTO: 249 10E3/UL (ref 150–450)
POTASSIUM SERPL-SCNC: 4.3 MMOL/L (ref 3.4–5.3)
PROT SERPL-MCNC: 7.9 G/DL (ref 6.4–8.3)
PSA SERPL DL<=0.01 NG/ML-MCNC: 1.5 NG/ML (ref 0–4.5)
RBC # BLD AUTO: 4.73 10E6/UL (ref 4.4–5.9)
SODIUM SERPL-SCNC: 140 MMOL/L (ref 135–145)
SP GR UR STRIP: >=1.03 (ref 1–1.03)
TRIGL SERPL-MCNC: 102 MG/DL
UROBILINOGEN UR STRIP-ACNC: 0.2 E.U./DL
WBC # BLD AUTO: 7.2 10E3/UL (ref 4–11)

## 2023-11-07 PROCEDURE — 82728 ASSAY OF FERRITIN: CPT | Performed by: INTERNAL MEDICINE

## 2023-11-07 PROCEDURE — 90480 ADMN SARSCOV2 VAC 1/ONLY CMP: CPT | Performed by: INTERNAL MEDICINE

## 2023-11-07 PROCEDURE — 99396 PREV VISIT EST AGE 40-64: CPT | Mod: 25 | Performed by: INTERNAL MEDICINE

## 2023-11-07 PROCEDURE — 73502 X-RAY EXAM HIP UNI 2-3 VIEWS: CPT | Mod: TC | Performed by: RADIOLOGY

## 2023-11-07 PROCEDURE — 90677 PCV20 VACCINE IM: CPT | Performed by: INTERNAL MEDICINE

## 2023-11-07 PROCEDURE — 85025 COMPLETE CBC W/AUTO DIFF WBC: CPT | Performed by: INTERNAL MEDICINE

## 2023-11-07 PROCEDURE — 36415 COLL VENOUS BLD VENIPUNCTURE: CPT | Performed by: INTERNAL MEDICINE

## 2023-11-07 PROCEDURE — 80061 LIPID PANEL: CPT | Performed by: INTERNAL MEDICINE

## 2023-11-07 PROCEDURE — G0103 PSA SCREENING: HCPCS | Performed by: INTERNAL MEDICINE

## 2023-11-07 PROCEDURE — 99214 OFFICE O/P EST MOD 30 MIN: CPT | Mod: 25 | Performed by: INTERNAL MEDICINE

## 2023-11-07 PROCEDURE — 73130 X-RAY EXAM OF HAND: CPT | Mod: TC | Performed by: RADIOLOGY

## 2023-11-07 PROCEDURE — 83540 ASSAY OF IRON: CPT | Performed by: INTERNAL MEDICINE

## 2023-11-07 PROCEDURE — 91320 SARSCV2 VAC 30MCG TRS-SUC IM: CPT | Performed by: INTERNAL MEDICINE

## 2023-11-07 PROCEDURE — 90471 IMMUNIZATION ADMIN: CPT | Performed by: INTERNAL MEDICINE

## 2023-11-07 PROCEDURE — 83550 IRON BINDING TEST: CPT | Performed by: INTERNAL MEDICINE

## 2023-11-07 PROCEDURE — 81003 URINALYSIS AUTO W/O SCOPE: CPT | Performed by: INTERNAL MEDICINE

## 2023-11-07 PROCEDURE — 86140 C-REACTIVE PROTEIN: CPT | Performed by: INTERNAL MEDICINE

## 2023-11-07 PROCEDURE — 80053 COMPREHEN METABOLIC PANEL: CPT | Performed by: INTERNAL MEDICINE

## 2023-11-07 PROCEDURE — 85652 RBC SED RATE AUTOMATED: CPT | Performed by: INTERNAL MEDICINE

## 2023-11-07 PROCEDURE — 72100 X-RAY EXAM L-S SPINE 2/3 VWS: CPT | Mod: TC | Performed by: RADIOLOGY

## 2023-11-07 ASSESSMENT — ENCOUNTER SYMPTOMS
EYE PAIN: 1
SORE THROAT: 0
COUGH: 0
JOINT SWELLING: 0
NAUSEA: 0
HEADACHES: 0
MYALGIAS: 0
CHILLS: 0
WEAKNESS: 0
FREQUENCY: 0
ABDOMINAL PAIN: 0
DIZZINESS: 0
ARTHRALGIAS: 1
HEMATURIA: 0
SHORTNESS OF BREATH: 0
FEVER: 0
PARESTHESIAS: 0
PALPITATIONS: 0
DYSURIA: 0
CONSTIPATION: 0
DIARRHEA: 0
NERVOUS/ANXIOUS: 0
HEMATOCHEZIA: 1
HEARTBURN: 0

## 2023-11-07 NOTE — PROGRESS NOTES
SUBJECTIVE:   CC: Antoinette is an 63 year old who presents for preventative health visit.       11/7/2023     3:33 PM   Additional Questions   Roomed by Gloria       Healthy Habits:     Getting at least 3 servings of Calcium per day:  Yes    Bi-annual eye exam:  Yes    Dental care twice a year:  Yes    Sleep apnea or symptoms of sleep apnea:  Daytime drowsiness    Diet:  Regular (no restrictions)    Frequency of exercise:  1 day/week    Duration of exercise:  Less than 15 minutes    Taking medications regularly:  Yes    Medication side effects:  None    Additional concerns today:  No  Pain  Associated symptoms include arthralgias. Pertinent negatives include no abdominal pain, chest pain, chills, congestion, coughing, fever, headaches, joint swelling, myalgias, nausea, rash, sore throat or weakness.   Mass  Associated symptoms include arthralgias. Pertinent negatives include no abdominal pain, chest pain, chills, congestion, coughing, fever, headaches, joint swelling, myalgias, nausea, rash, sore throat or weakness.       Patient is a pleasant 63-year-old gentleman who works for [employer] in [place].  I have known him for many years.  Has been fairly healthy although he developed UC.  He was treated with sulfasalazine but he stopped it on his own.  He was afraid that it was going to cause him damage to his liver or kidneys.  He never did follow-up with gastroenterology after stopping it and he continues to have intermittent blood in the stool although not nearly what it was.  He has been dealing with a lot of low back pain and right hip pain.  Also he is dealt with 3 episodes of iritis since the beginning of the year.  I did receive note from his ophthalmologist who was concerned about an underlying rheumatologic disorder and he should get HLA B27 testing etc.    Patient also is concerned about a lump in his right palm of his hand that is painful at times.    [place]  Enjoys  that.  Most of his time outside of work though is spent either golfing or with grandchildren.  Daughter has 7 children now.    Mother is here in town and doing fairly well although she had some shoulder problems recently.  Father and sister are in [place] and have some medical issues.            Social History     Tobacco Use    Smoking status: Never    Smokeless tobacco: Never   Substance Use Topics    Alcohol use: Yes             11/7/2023     3:20 PM   Alcohol Use   Prescreen: >3 drinks/day or >7 drinks/week? Yes   AUDIT SCORE  6         11/7/2023     3:20 PM   AUDIT - Alcohol Use Disorders Identification Test - Reproduced from the World Health Organization Audit 2001 (Second Edition)   1.  How often do you have a drink containing alcohol? 4 or more times a week   2.  How many drinks containing alcohol do you have on a typical day when you are drinking? 3 or 4   3.  How often do you have five or more drinks on one occasion? Less than monthly   4.  How often during the last year have you found that you were not able to stop drinking once you had started? Never   5.  How often during the last year have you failed to do what was normally expected of you because of drinking? Never   6.  How often during the last year have you needed a first drink in the morning to get yourself going after a heavy drinking session? Never   7.  How often during the last year have you had a feeling of guilt or remorse after drinking? Never   8.  How often during the last year have you been unable to remember what happened the night before because of your drinking? Never   9.  Have you or someone else been injured because of your drinking? No   10. Has a relative, friend, doctor or other health care worker been concerned about your drinking or suggested you cut down? No   TOTAL SCORE 6       Last PSA:   Prostate Specific Antigen Screen   Date Value Ref Range Status   01/12/2022 1.68 0.00 - 4.50 ug/L Final       Reviewed orders  "with patient. Reviewed health maintenance and updated orders accordingly - Yes      Reviewed and updated as needed this visit by clinical staff   Tobacco  Allergies  Meds              Reviewed and updated as needed this visit by Provider                     Review of Systems   Constitutional:  Negative for chills and fever.   HENT:  Negative for congestion, ear pain, hearing loss and sore throat.    Eyes:  Positive for pain. Negative for visual disturbance.   Respiratory:  Negative for cough and shortness of breath.    Cardiovascular:  Negative for chest pain, palpitations and peripheral edema.   Gastrointestinal:  Positive for hematochezia. Negative for abdominal pain, constipation, diarrhea, heartburn and nausea.   Genitourinary:  Negative for dysuria, frequency, genital sores, hematuria, impotence, penile discharge and urgency.   Musculoskeletal:  Positive for arthralgias. Negative for joint swelling and myalgias.   Skin:  Negative for rash.   Neurological:  Negative for dizziness, weakness, headaches and paresthesias.   Psychiatric/Behavioral:  Negative for mood changes. The patient is not nervous/anxious.          OBJECTIVE:   BP (!) 144/72 (BP Location: Left arm, Patient Position: Sitting, Cuff Size: Adult Large)   Pulse 87   Temp 98.7  F (37.1  C) (Tympanic)   Resp 16   Ht 1.842 m (6' 0.5\")   Wt 85.3 kg (188 lb)   SpO2 98%   BMI 25.15 kg/m      Physical Exam  GENERAL: healthy, alert and no distress  EYES: Eyes grossly normal to inspection, PERRL and conjunctivae and sclerae normal  HENT: ear canals and TM's normal, nose and mouth without ulcers or lesions  NECK: no adenopathy, no asymmetry, masses, or scars and thyroid normal to palpation  RESP: lungs clear to auscultation - no rales, rhonchi or wheezes  CV: regular rate and rhythm, normal S1 S2, no S3 or S4, no murmur, click or rub, no peripheral edema and peripheral pulses strong  ABDOMEN: soft, nontender, no hepatosplenomegaly, no masses and " bowel sounds normal  MS: Small Dupuytren's contracture?  SKIN: no suspicious lesions or rashes  NEURO: Normal strength and tone, mentation intact and speech normal  PSYCH: mentation appears normal, affect normal/bright        ASSESSMENT/PLAN:   1. Routine adult health maintenance  COVID and pneumococcal vaccines given today.  He declines flu shot.  - Lipid panel reflex to direct LDL Fasting; Future  - Comprehensive metabolic panel (BMP + Alb, Alk Phos, ALT, AST, Total. Bili, TP); Future  - UA Macroscopic with reflex to Microscopic and Culture - Lab Collect; Future  - PSA, screen; Future    2. Ulcerative proctitis with rectal bleeding (H)  I have urged him to follow-up with gastroenterology.  He probably needs another colonoscopy.  - CBC with platelets and differential; Future  - Ferritin; Future  - Iron and iron binding capacity; Future  - Adult GI  Referral - Consult Only; Future    3. Iritis with ulcerative colitis (H)  Concerning for an underlying rheumatologic issue.  Check x-rays and refer to rheumatology.  - CRP, inflammation; Future  - ESR: Erythrocyte sedimentation rate; Future  - CBC with platelets and differential; Future  - Adult Rheumatology  Referral; Future  - XR Lumbar Spine 2/3 Views; Future  - XR Pelvis w Hip Right G/E 2 Views; Future    4. Hip pain, right  As above.  - XR Lumbar Spine 2/3 Views; Future  - XR Pelvis w Hip Right G/E 2 Views; Future    5. Chronic bilateral low back pain without sciatica  As above.  - XR Lumbar Spine 2/3 Views; Future  - XR Pelvis w Hip Right G/E 2 Views; Future    6. Mass of right hand  Likely small Dupuytren's.  Check x-ray to rule out bony lesion.  Offered hand surgery but he is going to hold off on that for now.  Let me know if it worsens.  - XR Hand Right G/E 3 Views; Future     Patient has been advised of split billing requirements and indicates understanding: Yes      COUNSELING:   Reviewed preventive health counseling, as reflected in patient  "instructions      BMI:   Estimated body mass index is 25.15 kg/m  as calculated from the following:    Height as of this encounter: 1.842 m (6' 0.5\").    Weight as of this encounter: 85.3 kg (188 lb).         He reports that he has never smoked. He has never used smokeless tobacco.            RADHA ROSS MD  [facility]  "

## 2023-11-14 ENCOUNTER — TELEPHONE (OUTPATIENT)
Dept: INTERNAL MEDICINE | Facility: CLINIC | Age: 63
End: 2023-11-14
Payer: COMMERCIAL

## 2023-11-14 NOTE — TELEPHONE ENCOUNTER
The following are result notes from imaging patient had completed on 11/7/23:     EXAM: XR HAND RIGHT G/E 3 VIEWS  LOCATION: [facility]  DATE: 11/07/2023    Khoa Sinha MD  11/8/2023  5:36 PM CST   Hand x-ray is normal.  No worrisome findings there.    EXAM: XR PELVIS AND HIP RIGHT 2 VIEWS  LOCATION: [facility]  DATE: 11/7/2023    Khoa Sinha MD  11/8/2023  5:35 PM CST   You have some age-related/arthritis changes of the spine.  Nothing that looks too concerning or abnormal for someone your age.  Only minimal hip arthritis    EXAM: XR LUMBAR SPINE 2/3 VIEWS  LOCATION: [facility]  DATE: 11/7/2023    Khoa Sinha MD  11/8/2023  5:37 PM CST   No significant evidence of inflammatory arthritis on your pelvis and spine x-rays.  SI joints normal.  We will have to see what the rheumatologist thinks.    Regarding 11/7/23 lab work:     Your rheumatologic tests/inflammatory markers are very normal.  This makes an autoimmune disease fairly unlikely.  Kidney function and liver tests stable.  Urine normal.  Cholesterol amazing.  Iron levels and blood counts look fine.  PSA is low and stable.  Written by Khoa Sinha MD on 11/8/2023  5:44 PM CST

## 2023-12-30 ENCOUNTER — HEALTH MAINTENANCE LETTER (OUTPATIENT)
Age: 63
End: 2023-12-30

## 2024-02-02 ENCOUNTER — TRANSFERRED RECORDS (OUTPATIENT)
Dept: HEALTH INFORMATION MANAGEMENT | Facility: CLINIC | Age: 64
End: 2024-02-02
Payer: COMMERCIAL

## 2024-05-16 ENCOUNTER — OFFICE VISIT (OUTPATIENT)
Dept: INTERNAL MEDICINE | Facility: CLINIC | Age: 64
End: 2024-05-16
Payer: COMMERCIAL

## 2024-05-16 VITALS
DIASTOLIC BLOOD PRESSURE: 86 MMHG | HEIGHT: 72 IN | WEIGHT: 190.2 LBS | RESPIRATION RATE: 16 BRPM | BODY MASS INDEX: 25.76 KG/M2 | OXYGEN SATURATION: 98 % | TEMPERATURE: 97.3 F | SYSTOLIC BLOOD PRESSURE: 119 MMHG | HEART RATE: 74 BPM

## 2024-05-16 DIAGNOSIS — M77.12 LEFT LATERAL EPICONDYLITIS: Primary | ICD-10-CM

## 2024-05-16 DIAGNOSIS — K51.90: ICD-10-CM

## 2024-05-16 DIAGNOSIS — K51.211 ULCERATIVE PROCTITIS WITH RECTAL BLEEDING (H): ICD-10-CM

## 2024-05-16 DIAGNOSIS — H20.9: ICD-10-CM

## 2024-05-16 PROCEDURE — 99213 OFFICE O/P EST LOW 20 MIN: CPT | Performed by: INTERNAL MEDICINE

## 2024-05-16 RX ORDER — MELOXICAM 7.5 MG/1
7.5 TABLET ORAL DAILY
Qty: 21 TABLET | Refills: 0 | Status: SHIPPED | OUTPATIENT
Start: 2024-05-16

## 2024-05-16 ASSESSMENT — PAIN SCALES - GENERAL: PAINLEVEL: EXTREME PAIN (8)

## 2024-05-16 NOTE — PROGRESS NOTES
1. Left lateral epicondylitis  We discussed what tennis elbow is and how it is treated.  He was given his brace and was instructed on icing and stretching abdomen exercises.  Trial of 3 weeks of Mobic.  Refer to orthopedics if things or not improving.  - meloxicam (MOBIC) 7.5 MG tablet; Take 1 tablet (7.5 mg) by mouth daily  Dispense: 21 tablet; Refill: 0  - Wrist/Arm/Hand Bracking Supplies Order Tennis Elbow Arm Band; Right    2. Iritis with ulcerative colitis (H)  I recommended he follow-up with rheumatology as previously recommended.  - Adult Rheumatology  Referral; Future    3. Ulcerative proctitis with rectal bleeding (H)  He had his colonoscopy and they recommended a 5-year follow-up.  No further treatment at this time he states    Subjective   Antoinette is a 63 year old, presenting for the following health issues:  Recheck Medication and Elbow Pain (Left elbow pain , hurt while golfing. Range of motion is limited)      5/16/2024    11:09 AM   Additional Questions   Roomed by olegario eden     History of Present Illness       Reason for visit:  Left elbow joint pain  Symptom onset:  1-2 weeks ago  Symptoms include:  Mobility, stiffness  Symptom intensity:  Moderate  Symptom progression:  Staying the same  Had these symptoms before:  No  What makes it better:  Ice    He eats 0-1 servings of fruits and vegetables daily.He consumes 4 sweetened beverage(s) daily.He exercises with enough effort to increase his heart rate 10 to 19 minutes per day.  He exercises with enough effort to increase his heart rate 3 or less days per week.   He is taking medications regularly.           Patient comes in today with 2 weeks of severe left lateral arm pain.  Occurred while he was golfing.  He thinks maybe he injured himself somehow.  Wakes up with the discomfort.  Very stiff in the morning.  Certain movements like trying to things up is excruciating for him.  He is not taking anything for it.  He is icing a couple times a day.   He has never had anything like this before.    He missed an appointment with the rheumatologist          Objective    /86 (BP Location: Left arm, Patient Position: Sitting, Cuff Size: Adult Large)   Pulse 74   Temp 97.3  F (36.3  C)   Resp 16   Ht 1.829 m (6')   Wt 86.3 kg (190 lb 3.2 oz)   SpO2 98%   BMI 25.80 kg/m    Body mass index is 25.8 kg/m .  Physical Exam   Pleasant gentleman with significant pain over the left lateral epicondyle.            Signed Electronically by: RADHA ROSS MD  DME (Durable Medical Equipment) Orders and Documentation  Orders Placed This Encounter   Procedures    Wrist/Arm/Hand Bracking Supplies Order Tennis Elbow Arm Band; Right        The patient was assessed and it was determined the patient is in need of the following listed DME Supplies/Equipment. Please complete supporting documentation below to demonstrate medical necessity.

## 2024-10-08 ENCOUNTER — PATIENT OUTREACH (OUTPATIENT)
Dept: CARE COORDINATION | Facility: CLINIC | Age: 64
End: 2024-10-08
Payer: COMMERCIAL

## 2024-10-22 ENCOUNTER — PATIENT OUTREACH (OUTPATIENT)
Dept: CARE COORDINATION | Facility: CLINIC | Age: 64
End: 2024-10-22
Payer: COMMERCIAL

## 2024-10-23 NOTE — PROGRESS NOTES
NEW PATIENT RHEUMATOLOGY VISIT     Assessment & Plan     Problem List    CKD   Recurrent iritis    Concern for IBD associated inflammatory arthritis  Comment: Patient with a history of ulcerative colitis and recurrent iritis as well as lower back/right hip pain concerning for an underlying spondylarthritis.  Inflammatory markers have been within normal limits and x-rays of SI joints and lumbar spine did not reveal sacroiliitis or  spondylitis.  Some elements of his pain do sound inflammatory in nature likely also an overuse, musculoskeletal component as well.  Will obtain MRI imaging for more sensitive study.  He has no peripheral arthritis symptoms.    Plan:  -He is currently not on any medication for his inflammatory bowel disease, having self discontinued sulfasalazine, and continues to have intermittent flares with significant bloody stools.  I discussed with him the possible risks of having untreated autoimmune/inflammatory disease, specifically as it relates to cardiac health.  I encouraged him to make an appointment with his GI physician to discuss treatment options.  -MRI SI joint and lumbar spine to assess for inflammatory arthritis  -Patient was unaware that his kidney function is abnormal.  Blood pressure is also high in clinic today.  I recommended that he make an appointment with his primary care physician to discuss this.       Orders Placed This Encounter   Procedures    MR Sacroilliac Joints w Contrast    MR Lumbar Spine w/o & w Contrast    HLA-B27 Typing    Erythrocyte sedimentation rate auto    CRP inflammation    ALT    AST    Creatinine    CBC with platelets      The longitudinal plan of care for the diagnosis(es)/condition(s) as documented were addressed during this visit. Due to the added complexity in care, I will continue to support Antoinette in the subsequent management and with ongoing continuity of care.    I do not have follow-up appointments available until February and he will be on a  cruise that month so we will follow-up in March or sooner if imaging reveals inflammatory arthritis.    60 minutes spent on the day of the encounter doing chart review, history and exam, counseling and documentation.     Subjective         HPI    Pt referred for evaluation of IBD associated arthritis. He has Ulcerative Colitis and Iritis.     Was diagnosed with UC about 4-5 years ago at [facility] during a colonoscopy for blood in stool. Was not having abdominal pain, diarrhea, or weight loss. Was on sulfasalazine for this, but has been off for at least a year as was worried about side effects and felt his UC symptoms were under control. Last time there was blood in his stool (like a crime scene) was about 3 months ago, but he simply waits it out and does not get treatment.     About a year and half ago developed a sore eye (doesn't remember which) which he describes as a numbing pain along with redness and light sensitivity. Was diagnosed with Iritis and prescribed steroid eye drops which resolved his flare. Reports about 3 more flares since this diagnosis which he uses his steroid eye drops. Follows with [facility] for Iritis and the last note I can see is from 6/2022, but he says he was seen this summer.     In May was diagnosed with left lateral epicondylitis which he reports he still has. He took a month and a half off from golf, iced it, took meloxicam, and although it did get better, did not go away. Once he started golfing again, it returned but not as bad.     Reports a lot of lower back and right hip pain which started around 2020  Reports sometimes it can barely move in the morning, stiffness lasts for about half an hour.   Had daily symptoms.    Symptoms are worse after standing for a long time, but isn't sure if this is the same with sitting for a long time.   Feels he needs to stretch.   Does not take any NSAIDs.    No other joints are giving him issues bexcpet of this DIPs over his pinkies which are  sore and he attributes this to holding his golf club    Is developing thickened tissue in a linear fashion over the palm under his ring finger bilaterally which appears consistent with dypenten's contractures.       No raynaud's, no oral or nasal ulcers, no rashes, no skin sores, no dyspnea, no chest pain, no fevers or weigh loss, no hx of blood clots, +dry eyes, no dry mouth, +numbness or tingling over left calf  intermittent     No family or personal Hx of known autoimmune disease  Not a previous smoker. Not a current smoker.       Lab and Imaging review:    I reviewed recent labs and imaging includin2023 CBC, esr, crp wnl, creatinine 1.4    No current outpatient medications on file.  Allergies:  Allergies   Allergen Reactions    Penicillins Unknown     Medical Hx:  No past medical history on file.  Surgical Hx:  Past Surgical History:   Procedure Laterality Date    COLONOSCOPY  2021     Family Hx:  Family History   Problem Relation Age of Onset    Hypertension Father      Social Hx:  Social History     Tobacco Use    Smoking status: Never    Smokeless tobacco: Never   Vaping Use    Vaping status: Never Used   Substance Use Topics    Alcohol use: Yes    Drug use: Never        Objective   Physical Exam   BP (!) 134/91   Pulse 89   Wt 84.3 kg (185 lb 12.8 oz)   SpO2 97%   BMI 25.20 kg/m    General: alert, well appearing, no distress  HEENT: no alopecia, clear conjunctiva,   Cardiac: warm and well perfused   Pulm: normal respiratory effort,   MSK: Hand, wrist, elbow, and shoulder joints without evidence of synovitis or effusion. Intact and nonpainful range of motion.  Ankle/knee/hip joints without erythema/effusion/tenderness to palpation and with intact nonpainful range of motion. Negative Marcio bilaterally, normal extension, flexion, and side bending of spine.   Skin: No rashes, warm and well-perfused. No nail pitting, digital ulceration, dactylitis, or telangiectasias. No subcutaneous nodules.          Kaitlin Yeung MD  Rheumatology

## 2024-10-24 ENCOUNTER — LAB (OUTPATIENT)
Dept: LAB | Facility: CLINIC | Age: 64
End: 2024-10-24
Payer: COMMERCIAL

## 2024-10-24 ENCOUNTER — OFFICE VISIT (OUTPATIENT)
Dept: RHEUMATOLOGY | Facility: CLINIC | Age: 64
End: 2024-10-24
Attending: INTERNAL MEDICINE
Payer: COMMERCIAL

## 2024-10-24 VITALS
BODY MASS INDEX: 25.2 KG/M2 | SYSTOLIC BLOOD PRESSURE: 134 MMHG | DIASTOLIC BLOOD PRESSURE: 91 MMHG | OXYGEN SATURATION: 97 % | WEIGHT: 185.8 LBS | HEART RATE: 89 BPM

## 2024-10-24 DIAGNOSIS — N18.30 STAGE 3 CHRONIC KIDNEY DISEASE, UNSPECIFIED WHETHER STAGE 3A OR 3B CKD (H): Primary | ICD-10-CM

## 2024-10-24 DIAGNOSIS — H20.9: ICD-10-CM

## 2024-10-24 DIAGNOSIS — K51.90: ICD-10-CM

## 2024-10-24 LAB
ALT SERPL W P-5'-P-CCNC: 29 U/L (ref 0–70)
AST SERPL W P-5'-P-CCNC: 32 U/L (ref 0–45)
CREAT SERPL-MCNC: 1.53 MG/DL (ref 0.67–1.17)
CRP SERPL-MCNC: <3 MG/L
EGFRCR SERPLBLD CKD-EPI 2021: 51 ML/MIN/1.73M2
ERYTHROCYTE [DISTWIDTH] IN BLOOD BY AUTOMATED COUNT: 13.8 % (ref 10–15)
ERYTHROCYTE [SEDIMENTATION RATE] IN BLOOD BY WESTERGREN METHOD: 9 MM/HR (ref 0–20)
HCT VFR BLD AUTO: 43.7 % (ref 40–53)
HGB BLD-MCNC: 14.5 G/DL (ref 13.3–17.7)
MCH RBC QN AUTO: 29.7 PG (ref 26.5–33)
MCHC RBC AUTO-ENTMCNC: 33.2 G/DL (ref 31.5–36.5)
MCV RBC AUTO: 90 FL (ref 78–100)
PLATELET # BLD AUTO: 288 10E3/UL (ref 150–450)
RBC # BLD AUTO: 4.88 10E6/UL (ref 4.4–5.9)
WBC # BLD AUTO: 8.2 10E3/UL (ref 4–11)

## 2024-10-24 PROCEDURE — 84450 TRANSFERASE (AST) (SGOT): CPT

## 2024-10-24 PROCEDURE — G2211 COMPLEX E/M VISIT ADD ON: HCPCS | Performed by: STUDENT IN AN ORGANIZED HEALTH CARE EDUCATION/TRAINING PROGRAM

## 2024-10-24 PROCEDURE — 85027 COMPLETE CBC AUTOMATED: CPT

## 2024-10-24 PROCEDURE — 86140 C-REACTIVE PROTEIN: CPT

## 2024-10-24 PROCEDURE — 84460 ALANINE AMINO (ALT) (SGPT): CPT

## 2024-10-24 PROCEDURE — 82565 ASSAY OF CREATININE: CPT

## 2024-10-24 PROCEDURE — 85652 RBC SED RATE AUTOMATED: CPT

## 2024-10-24 PROCEDURE — 36415 COLL VENOUS BLD VENIPUNCTURE: CPT

## 2024-10-24 PROCEDURE — 81374 HLA I TYPING 1 ANTIGEN LR: CPT

## 2024-10-24 PROCEDURE — 99205 OFFICE O/P NEW HI 60 MIN: CPT | Performed by: STUDENT IN AN ORGANIZED HEALTH CARE EDUCATION/TRAINING PROGRAM

## 2024-10-29 LAB
B LOCUS: NORMAL
B27TEST METHOD: NORMAL

## 2024-12-08 ENCOUNTER — HEALTH MAINTENANCE LETTER (OUTPATIENT)
Age: 64
End: 2024-12-08

## 2025-03-19 NOTE — PROGRESS NOTES
RETURN PATIENT RHEUMATOLOGY VISIT     Assessment & Plan     Problem List    CKD   Recurrent iritis    Concern for IBD associated inflammatory arthritis  Comment: Patient with a history of ulcerative colitis and recurrent iritis as well as lower back/right hip pain concerning for an underlying spondylarthritis.  Inflammatory markers have been within normal limits and x-rays of SI joints and lumbar spine did not reveal sacroiliitis or  spondylitis.  Some elements of his pain do sound inflammatory in nature likely also an overuse, musculoskeletal component as well.  Ordered MRI imaging for more sensitive study, however patient unable to get imaging study due to cost concerns.  He has no peripheral arthritis symptoms.    Plan:  -He is currently not on any medication for his inflammatory bowel disease, having self discontinued sulfasalazine, and continues to have intermittent flares with significant bloody stools.  I discussed with him the possible risks of having untreated autoimmune/inflammatory disease.  I encouraged him to make an appointment with his GI physician to discuss treatment options.  We discussed this again at this appointment.  -MRI SI joint and lumbar spine to assess for inflammatory arthritis is ordered.  Patient will reach out to me if his symptoms worsen and he decides that he wants to go forward with this imaging study.  -Noted to have worsening creatinine on my recent lab work so referral made to nephrology.  Has nephrology appointment on Tuesday for his elevated creatinine.  Reminded him about this appointment today.  -Avoid NSAIDs in the setting of his CKD  -Encouraged at least yearly follow-up with his ophthalmologist and to present to care if he should experience any flares of red painful eye.  -We discussed today how his possible inflammatory arthritis, inflammatory bowel disease, and inflammatory eye disease are all related.    No orders of the defined types were placed in this encounter.      Patient reports he would prefer to follow-up as needed if his symptoms worsen and if he decides to proceed with MRI.    30 minutes spent on the day of the encounter doing chart review, history and exam, counseling and documentation.     Subjective     He was unable to obtain MRI due to projected cost of the exam.  He reports that he really thinks that his right buttock/lower back pain is due to muscle spasms.  His symptoms are aggravated after picking up his grandchildren for a few days and then dissipate.  His pain does have some gelling features worse in the morning with improvement with walking.  He reports some intermittent episodes of eye pain which resolved within a day and are not associated with any eye redness.  He continues to be off of sulfasalazine and has not yet made an appoint with his GI doctors.  He continues to have bloody stools but reports they are not as significant as previously.  He does endorse history of hemorrhoids as well.  He denies any pain with eating.  No joint pain or swelling elsewhere.        HPI    Pt referred for evaluation of IBD associated arthritis. He has Ulcerative Colitis and Iritis.     Was diagnosed with UC about 4-5 years ago at [facility] during a colonoscopy for blood in stool. Was not having abdominal pain, diarrhea, or weight loss. Was on sulfasalazine for this, but has been off for at least a year as was worried about side effects and felt his UC symptoms were under control. Last time there was blood in his stool (like a crime scene) was about 3 months ago, but he simply waits it out and does not get treatment.     About a year and half ago developed a sore eye (doesn't remember which) which he describes as a numbing pain along with redness and light sensitivity. Was diagnosed with Iritis and prescribed steroid eye drops which resolved his flare. Reports about 3 more flares since this diagnosis which he uses his steroid eye drops. Follows with [facility] for  Iritis and the last note I can see is from 6/2022, but he says he was seen this summer.     In May was diagnosed with left lateral epicondylitis which he reports he still has. He took a month and a half off from golf, iced it, took meloxicam, and although it did get better, did not go away. Once he started golfing again, it returned but not as bad.     Reports a lot of lower back and right hip pain which started around 2020  Reports sometimes it can barely move in the morning, stiffness lasts for about half an hour.   Had daily symptoms.    Symptoms are worse after standing for a long time, but isn't sure if this is the same with sitting for a long time.   Feels he needs to stretch.   Does not take any NSAIDs.    No other joints are giving him issues bexcpet of this DIPs over his pinkies which are sore and he attributes this to holding his golf club    Is developing thickened tissue in a linear fashion over the palm under his ring finger bilaterally which appears consistent with dypenten's contractures.       No raynaud's, no oral or nasal ulcers, no rashes, no skin sores, no dyspnea, no chest pain, no fevers or weigh loss, no hx of blood clots, +dry eyes, no dry mouth, +numbness or tingling over left calf  intermittent     No family or personal Hx of known autoimmune disease  Not a previous smoker. Not a current smoker.       Lab and Imaging review:    I reviewed recent labs and imaging including:      10/24/2024  Negative HLA-B27, normal ESR and CRP, normal AST and ALT, normal CBC  Creatinine up trended to 1.53 from 1.4 one year prior      11/2023 pelvic x-ray with degenerative changes  Lumbar x-ray without any inflammatory changes  Hand x-rays normal  11/2023 CBC, esr, crp wnl, creatinine 1.4    No current outpatient medications on file.  Allergies:  Allergies   Allergen Reactions    Penicillins Unknown     Medical Hx:  No past medical history on file.  Surgical Hx:  Past Surgical History:   Procedure Laterality  Date    COLONOSCOPY  12/1/2021     Family Hx:  Family History   Problem Relation Age of Onset    Hypertension Father      Social Hx:  Social History     Tobacco Use    Smoking status: Never    Smokeless tobacco: Never   Vaping Use    Vaping status: Never Used   Substance Use Topics    Alcohol use: Yes    Drug use: Never        Objective   Physical Exam   /84 (BP Location: Right arm, Patient Position: Sitting, Cuff Size: Adult Large)   Pulse 72   Wt 84.4 kg (186 lb)   BMI 25.23 kg/m    General: alert, well appearing, no distress  HEENT: clear conjunctiva,   Cardiac: warm and well perfused   Pulm: normal respiratory effort,   MSK: Negative Marcio bilaterally, normal extension, flexion, and side bending of spine.   Skin: No rashes, warm and well-perfused. No nail pitting, digital ulceration, dactylitis, or telangiectasias. No subcutaneous nodules.         Kaitlin Yeung MD  Rheumatology

## 2025-03-20 ENCOUNTER — OFFICE VISIT (OUTPATIENT)
Dept: RHEUMATOLOGY | Facility: CLINIC | Age: 65
End: 2025-03-20
Payer: COMMERCIAL

## 2025-03-20 VITALS
BODY MASS INDEX: 25.23 KG/M2 | DIASTOLIC BLOOD PRESSURE: 84 MMHG | WEIGHT: 186 LBS | HEART RATE: 72 BPM | SYSTOLIC BLOOD PRESSURE: 120 MMHG

## 2025-03-20 DIAGNOSIS — M25.50 ARTHRALGIA, UNSPECIFIED JOINT: Primary | ICD-10-CM

## 2025-03-27 ENCOUNTER — LAB (OUTPATIENT)
Dept: LAB | Facility: CLINIC | Age: 65
End: 2025-03-27
Payer: COMMERCIAL

## 2025-03-27 DIAGNOSIS — R79.89 ELEVATED SERUM CREATININE: ICD-10-CM

## 2025-03-27 LAB
ALBUMIN SERPL BCG-MCNC: 4.5 G/DL (ref 3.5–5.2)
ALBUMIN UR-MCNC: ABNORMAL MG/DL
ANION GAP SERPL CALCULATED.3IONS-SCNC: 14 MMOL/L (ref 7–15)
APPEARANCE UR: CLEAR
BACTERIA #/AREA URNS HPF: ABNORMAL /HPF
BILIRUB UR QL STRIP: NEGATIVE
BUN SERPL-MCNC: 12.8 MG/DL (ref 8–23)
CALCIUM SERPL-MCNC: 9.7 MG/DL (ref 8.8–10.4)
CHLORIDE SERPL-SCNC: 101 MMOL/L (ref 98–107)
COLOR UR AUTO: YELLOW
CREAT SERPL-MCNC: 1.18 MG/DL (ref 0.67–1.17)
CREAT UR-MCNC: 443 MG/DL
CYSTATIN C (ROCHE): 0.8 MG/L (ref 0.6–1)
EGFRCR SERPLBLD CKD-EPI 2021: 69 ML/MIN/1.73M2
GFR/BSA.PRED SERPLBLD CYS-BASED-ARV: >90 ML/MIN/1.73M2
GLUCOSE SERPL-MCNC: 97 MG/DL (ref 70–99)
GLUCOSE UR STRIP-MCNC: NEGATIVE MG/DL
HCO3 SERPL-SCNC: 24 MMOL/L (ref 22–29)
HCT VFR BLD AUTO: 41.7 % (ref 40–53)
HGB BLD-MCNC: 14 G/DL (ref 13.3–17.7)
HGB UR QL STRIP: NEGATIVE
IRON BINDING CAPACITY (ROCHE): 324 UG/DL (ref 240–430)
IRON SATN MFR SERPL: 33 % (ref 15–46)
IRON SERPL-MCNC: 107 UG/DL (ref 61–157)
KETONES UR STRIP-MCNC: ABNORMAL MG/DL
LEUKOCYTE ESTERASE UR QL STRIP: NEGATIVE
MICROALBUMIN UR-MCNC: 13 MG/L
MICROALBUMIN/CREAT UR: 2.93 MG/G CR (ref 0–17)
MUCOUS THREADS #/AREA URNS LPF: PRESENT /LPF
NITRATE UR QL: NEGATIVE
PH UR STRIP: 5.5 [PH] (ref 5–7)
PHOSPHATE SERPL-MCNC: 3.5 MG/DL (ref 2.5–4.5)
POTASSIUM SERPL-SCNC: 4.1 MMOL/L (ref 3.4–5.3)
PTH-INTACT SERPL-MCNC: 63 PG/ML (ref 15–65)
RBC #/AREA URNS AUTO: ABNORMAL /HPF
SODIUM SERPL-SCNC: 139 MMOL/L (ref 135–145)
SP GR UR STRIP: >=1.03 (ref 1–1.03)
SQUAMOUS #/AREA URNS AUTO: ABNORMAL /LPF
UROBILINOGEN UR STRIP-ACNC: 1 E.U./DL
VIT D+METAB SERPL-MCNC: 17 NG/ML (ref 20–50)
WBC #/AREA URNS AUTO: ABNORMAL /HPF

## 2025-04-01 ENCOUNTER — VIRTUAL VISIT (OUTPATIENT)
Dept: NEPHROLOGY | Facility: CLINIC | Age: 65
End: 2025-04-01
Attending: STUDENT IN AN ORGANIZED HEALTH CARE EDUCATION/TRAINING PROGRAM
Payer: COMMERCIAL

## 2025-04-01 DIAGNOSIS — N18.30 STAGE 3 CHRONIC KIDNEY DISEASE, UNSPECIFIED WHETHER STAGE 3A OR 3B CKD (H): ICD-10-CM

## 2025-04-01 DIAGNOSIS — R79.89 ELEVATED SERUM CREATININE: Primary | ICD-10-CM

## 2025-04-01 PROCEDURE — 98002 SYNCH AUDIO-VIDEO NEW MOD 45: CPT | Performed by: INTERNAL MEDICINE

## 2025-04-01 PROCEDURE — 1126F AMNT PAIN NOTED NONE PRSNT: CPT | Mod: 95 | Performed by: INTERNAL MEDICINE

## 2025-04-01 RX ORDER — ERGOCALCIFEROL 1.25 MG/1
50000 CAPSULE, LIQUID FILLED ORAL WEEKLY
Status: CANCELLED | OUTPATIENT
Start: 2025-04-01

## 2025-04-01 NOTE — PROGRESS NOTES
Nephrology Outpatient Consult Note (04/01/2025)     Requesting Provider  Kaitlin Yeung MD  [address] [address]    Chief Complaint/Reason for Visit  Elevated serum creatinine    History of Present Illness  This is 64-year-old male who was referred to our clinic for further workup and management of chronic kidney disease.  The patient's past medical history is notable for iritis and a concern for IBD associated inflammatory arthritis.    With regards to his kidney function, we have laboratory data going back to 2018.  Back then, his serum creatinine was 1.28 mg/dL.  In 20 23-20 24 his serum creatinine measurements run higher between 1.4 to 1.5 mg/dL.    His most recent laboratory evaluation was last month.  Serum creatinine was 1.18 mg/dL with an estimated GFR of 69 L/min.  Electrolytes were within the normal range.  Serum albumin is normal.  Urine albumin to creatinine ratio is negative for albuminuria.  Vitamin D level was low at 17.  Hemoglobin was 14.  Urinalysis is notable for pyuria.  However, is negative for hematuria or proteinuria.  No previous abdominal imaging is available for me to review.    The patient was referred to nephrology by a rheumatologist due to persistently elevated serum creatinine levels noted in lab results dating back to 2018. The patient had not experienced symptoms of kidney disease such as hematuria, flank pain, or lower extremity swelling. He denies a history of hypertension, diabetes, or known kidney disease. There is no family history of kidney problems. Notably, the patient has a history of ulcerative colitis (UC), diagnosed approximately 2.5 to 3 years ago. He is not currently on any maintenance therapy due to a lapse in follow-up with GI, and has experienced periodic flares characterized by hematochezia.     The patient leads a relatively sedentary lifestyle, working remotely in IT, though he remains active through seasonal golf  and caring for seven grandchildren. He is a former . He denies symptoms such as shortness of breath or exertional fatigue beyond what he attributes to deconditioning.    The following portions of the patient's history were reviewed and updated as appropriate: allergies, current medications, past family history, past medical history, past social history, past surgical history, and problem list.    Subjective   Review of Systems  A comprehensive review of systems was performed. Pertinent positives and negatives are described above.    Past Medical/Surgical History  Patient  has no past medical history of Arthritis, Cancer (H), Cerebral infarction (H), Congestive heart failure (H), COPD (chronic obstructive pulmonary disease) (H), Depressive disorder, Diabetes (H), Heart disease, History of blood transfusion, Hypertension, Thyroid disease, or Uncomplicated asthma.  Patient  has a past surgical history that includes colonoscopy (12/1/2021).    Social History  Patient  reports that he has never smoked. He has never used smokeless tobacco. He reports current alcohol use. He reports that he does not use drugs.    Family History  family history includes Hypertension in his father.     Physical Examination  There were no vitals taken for this visit. There is no height or weight on file to calculate BMI.    Objective   Pertinent Laboratory and Imaging Results  Most Recent Serum Chemistries  Lab Results   Component Value Date    WBC 8.2 10/24/2024    HGB 14.0 03/27/2025    HCT 41.7 03/27/2025     10/24/2024     03/27/2025    POTASSIUM 4.1 03/27/2025    CHLORIDE 101 03/27/2025    CO2 24 03/27/2025    BUN 12.8 03/27/2025    CR 1.18 (H) 03/27/2025    GLC 97 03/27/2025    SED 9 10/24/2024    AST 32 10/24/2024    ALT 29 10/24/2024    ALKPHOS 61 11/07/2023     Most Recent Urinalysis  Lab Results   Component Value Date    COLOR Yellow 03/27/2025    APPEARANCE Clear 03/27/2025    URINEGLC Negative  03/27/2025    URINEBILI Negative 03/27/2025    URINEKETONE Trace (A) 03/27/2025    SG >=1.030 03/27/2025    URINEPH 5.5 03/27/2025    PROTEIN Trace (A) 03/27/2025    UROBILINOGEN 1.0 03/27/2025    NITRITE Negative 03/27/2025    LEUKEST Negative 03/27/2025     No current outpatient medications on file.     No current facility-administered medications for this visit.        Assessment & Plan   # Elevated serum creatinine  # Ulcerative colitis, and iritis  # Vitamin D deficiency    The patient has elevated serum creatinine. However, his cystatin C-based GFR was normal, suggesting no significant renal impairment. The most recent urinalysis shows a bland urine sediment, and there is no proteinuria.  This makes it fairly unlikely that the patient has an active parenchymal kidney disease.     With regards to workup, I would like to obtain a kidney and bladder ultrasound to rule out obstruction, and urinary retention. Given his history of ulcerative colitis, I would like to obtain a basic serological evaluation for parenchymal kidney disease (glomerulonephritis).  If the results of the evaluation is normal, then no additional workup would be warranted.    With regards to management, I suggested prescribing vitamin D 50,000 units once per week for 6 doses.  However, the patient prefers to take an over-the-counter vitamin D.  He is not hypertensive and he is not a diabetic.  I emphasized to him the importance of healthy lifestyle.  He drinks alcohol daily.  I pointed out that my recommendation is no more than 2 drinks daily.    My recommendations are the following:  - Kidney and bladder ultrasound.  I will let you know when I get the results.  - Basic serological evaluation for autoimmune related kidney disease.  I will let you know when I get the results.  - Take 1000 to 2000 units of vitamin D daily.  - Referral to GI given your history of ulcerative colitis.  - Return visit in 6 months to 1 year.    Total time was of this  encounter on the date of service was 50 minutes. All questions were answered to the patient's satisfaction.  Chart documentation was completed, in part, with [employer] voice-recognition software. Even though reviewed, some grammatical, spelling, and word errors may remain.    Telemedicine Visit Addendum:  Consultation provided at the request of above provider for advice regarding the diagnosis and treatment of patient's condition. The patient's condition can be safely assessed via telemedicine. Patient has agreed to receiving services via telemedicine technology. Date of service is 04/01/25. Time Service Began: 2:30 PM. Time Service Ended: 3:00 PM. Originating Location (pt. Location): Home. Distant Location (provider location):  off site.Reason that telemedicine is appropriate: Patient unable to travel. Mode of transmission: Secure real time interactive audio and visual telecommunication system  [employer]    Orders placed today:  Orders Placed This Encounter   Procedures    US Renal Complete Non-Vascular    US Bladder w Pre and Post Void Residual    Complement C3    Complement C4    ANCA IgG by IFA with Reflex to Titer    Anti Nuclear Jeri IgG by IFA with Reflex    DNA double stranded antibodies    Hepatitis B Surface Antibody    Hepatitis B surface antigen    Hepatitis C antibody    Hemoglobin and hematocrit    Iron and iron binding capacity    Ferritin    Renal panel    Parathyroid Hormone Intact    Vitamin D Deficiency    UA with Microscopic reflex to Culture    Albumin Random Urine Quantitative with Creat Ratio    Adult GI  Referral - Consult Only     Gary Walker MD  Division of Nephrology and Hypertension  [employer] (Scaffold)   Vocera Web Console (Scaffold)

## 2025-04-01 NOTE — NURSING NOTE
Current patient location: [address] [place] [address]    Is the patient currently in the state of [place]? YES    Visit mode: VIDEO    If the visit is dropped, the patient can be reconnected by:VIDEO VISIT: Send to e-mail at: [email or web address]    Will anyone else be joining the visit? NO  (If patient encounters technical issues they should call [phone]    Are changes needed to the allergy or medication list? No    Are refills needed on medications prescribed by this physician? NO    Rooming Documentation:  Questionnaire(s) completed    Reason for visit: Consult    Jonathan ARREAGA

## 2025-04-01 NOTE — PATIENT INSTRUCTIONS
It was a pleasure taking care of you today.  I've included a brief summary of our discussion and care plan from today's visit below.      My recommendations are summarized as follows:  - Kidney and bladder ultrasound.  I will let you know when I get the results.  - Basic serological evaluation for autoimmune related kidney disease.  I will let you know when I get the results.  - Take 1000 to 2000 units of vitamin D daily.  - Referral to GI given your history of ulcerative colitis.  - Return visit in 6 months to 1 year.    Who do I call with any questions after my visit?  Please be in touch if there are any further questions that arise following today's visit.  There are multiple ways to contact your nephrology care team.      During business hours, you may reach your Nephrology RN Care Coordinator, Pao Barrera at [phone]. For urgent/emergent questions after business hours, you may reach the on-call Nephrology Fellow by contacting the [facility]  at [phone]. You can always send a secure message through [employer].  [employer] messages are answered by your nurse or doctor typically within 24 hours.  Please allow extra time on weekends and holidays.      How do I schedule appointments?  To schedule or reschedule an appointment, please call [phone]. To schedule imaging please call [phone]. To schedule your lab appointment at [facility] 1st floor lab call [phone].    How will I get the results of any tests ordered?    You will receive all of your results.  If you have signed up for [employer], any tests ordered at your visit will be available to you after your physician reviews them.  Typically this takes 1-2 weeks.  If there are urgent results that require a change in your care plan, your physician or nurse will call you to discuss the next steps.      What is [employer]?  [employer] is a secure way for you to access all of your healthcare records from the [facility]  [place].  It is a web based computer program, so you can sign on to it from any location.  It also allows you to send secure messages to your care team.  I recommend signing up for [employer] access if you have not already done so and are comfortable with using a computer.      Sincerely,    Gary Walker MD  [facility]  Division of Nephrology and Hypertension

## 2025-04-03 ENCOUNTER — DOCUMENTATION ONLY (OUTPATIENT)
Dept: GASTROENTEROLOGY | Facility: CLINIC | Age: 65
End: 2025-04-03
Payer: COMMERCIAL

## 2025-04-15 ENCOUNTER — HOSPITAL ENCOUNTER (OUTPATIENT)
Dept: ULTRASOUND IMAGING | Facility: CLINIC | Age: 65
Discharge: HOME | End: 2025-04-15
Attending: INTERNAL MEDICINE
Payer: COMMERCIAL

## 2025-04-15 DIAGNOSIS — R79.89 ELEVATED SERUM CREATININE: ICD-10-CM

## 2025-04-15 PROCEDURE — 76770 US EXAM ABDO BACK WALL COMP: CPT

## 2025-04-15 PROCEDURE — 76857 US EXAM PELVIC LIMITED: CPT

## 2025-05-06 ENCOUNTER — PATIENT OUTREACH (OUTPATIENT)
Dept: CARE COORDINATION | Facility: CLINIC | Age: 65
End: 2025-05-06
Payer: COMMERCIAL

## 2025-08-16 ENCOUNTER — HOSPITAL ENCOUNTER (OUTPATIENT)
Dept: MRI IMAGING | Facility: CLINIC | Age: 65
Discharge: HOME | End: 2025-08-16
Attending: STUDENT IN AN ORGANIZED HEALTH CARE EDUCATION/TRAINING PROGRAM | Admitting: STUDENT IN AN ORGANIZED HEALTH CARE EDUCATION/TRAINING PROGRAM
Payer: COMMERCIAL

## 2025-08-16 ENCOUNTER — HOSPITAL ENCOUNTER (EMERGENCY)
Facility: CLINIC | Age: 65
End: 2025-08-16
Payer: COMMERCIAL

## 2025-08-16 DIAGNOSIS — H20.9: ICD-10-CM

## 2025-08-16 DIAGNOSIS — K51.90: ICD-10-CM

## 2025-08-16 PROCEDURE — 72195 MRI PELVIS W/O DYE: CPT
